# Patient Record
Sex: FEMALE | Race: WHITE | NOT HISPANIC OR LATINO | Employment: FULL TIME | URBAN - METROPOLITAN AREA
[De-identification: names, ages, dates, MRNs, and addresses within clinical notes are randomized per-mention and may not be internally consistent; named-entity substitution may affect disease eponyms.]

---
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
## 2020-01-10 ENCOUNTER — OFFICE VISIT - HEALTHEAST (OUTPATIENT)
Dept: FAMILY MEDICINE | Facility: CLINIC | Age: 20
End: 2020-01-10

## 2020-01-10 DIAGNOSIS — Z30.46 NEXPLANON REMOVAL: ICD-10-CM

## 2020-02-03 ENCOUNTER — OFFICE VISIT - HEALTHEAST (OUTPATIENT)
Dept: FAMILY MEDICINE | Facility: CLINIC | Age: 20
End: 2020-02-03

## 2020-02-03 DIAGNOSIS — R19.7 DIARRHEA, UNSPECIFIED TYPE: ICD-10-CM

## 2020-02-03 DIAGNOSIS — R11.10 VOMITING, INTRACTABILITY OF VOMITING NOT SPECIFIED, PRESENCE OF NAUSEA NOT SPECIFIED, UNSPECIFIED VOMITING TYPE: ICD-10-CM

## 2020-02-03 LAB
ALBUMIN SERPL-MCNC: 4.4 G/DL (ref 3.5–5)
ALBUMIN UR-MCNC: NEGATIVE MG/DL
ALP SERPL-CCNC: 48 U/L (ref 45–120)
ALT SERPL W P-5'-P-CCNC: 18 U/L (ref 0–45)
ANION GAP SERPL CALCULATED.3IONS-SCNC: 9 MMOL/L (ref 5–18)
APPEARANCE UR: CLEAR
AST SERPL W P-5'-P-CCNC: 13 U/L (ref 0–40)
BASOPHILS # BLD AUTO: 0.1 THOU/UL (ref 0–0.2)
BASOPHILS NFR BLD AUTO: 1 % (ref 0–2)
BILIRUB SERPL-MCNC: 0.3 MG/DL (ref 0–1)
BILIRUB UR QL STRIP: NEGATIVE
BUN SERPL-MCNC: 11 MG/DL (ref 8–22)
CALCIUM SERPL-MCNC: 10 MG/DL (ref 8.5–10.5)
CHLORIDE BLD-SCNC: 106 MMOL/L (ref 98–107)
CO2 SERPL-SCNC: 26 MMOL/L (ref 22–31)
COLOR UR AUTO: YELLOW
CREAT SERPL-MCNC: 0.72 MG/DL (ref 0.6–1.1)
EOSINOPHIL # BLD AUTO: 0.1 THOU/UL (ref 0–0.4)
EOSINOPHIL NFR BLD AUTO: 2 % (ref 0–6)
ERYTHROCYTE [DISTWIDTH] IN BLOOD BY AUTOMATED COUNT: 11.7 % (ref 11–14.5)
GFR SERPL CREATININE-BSD FRML MDRD: >60 ML/MIN/1.73M2
GLUCOSE BLD-MCNC: 82 MG/DL (ref 70–125)
GLUCOSE UR STRIP-MCNC: NEGATIVE MG/DL
HCG UR QL: NEGATIVE
HCT VFR BLD AUTO: 42.5 % (ref 35–47)
HGB BLD-MCNC: 14.5 G/DL (ref 12–16)
HGB UR QL STRIP: NEGATIVE
KETONES UR STRIP-MCNC: NEGATIVE MG/DL
LEUKOCYTE ESTERASE UR QL STRIP: NEGATIVE
LYMPHOCYTES # BLD AUTO: 2.6 THOU/UL (ref 0.8–4.4)
LYMPHOCYTES NFR BLD AUTO: 33 % (ref 20–40)
MCH RBC QN AUTO: 33.5 PG (ref 27–34)
MCHC RBC AUTO-ENTMCNC: 34.1 G/DL (ref 32–36)
MCV RBC AUTO: 98 FL (ref 80–100)
MONOCYTES # BLD AUTO: 0.4 THOU/UL (ref 0–0.9)
MONOCYTES NFR BLD AUTO: 5 % (ref 2–10)
NEUTROPHILS # BLD AUTO: 4.7 THOU/UL (ref 2–7.7)
NEUTROPHILS NFR BLD AUTO: 59 % (ref 50–70)
NITRATE UR QL: NEGATIVE
PH UR STRIP: 6 [PH] (ref 5–8)
PLATELET # BLD AUTO: 289 THOU/UL (ref 140–440)
PMV BLD AUTO: 7.3 FL (ref 7–10)
POTASSIUM BLD-SCNC: 4.1 MMOL/L (ref 3.5–5)
PROT SERPL-MCNC: 7.2 G/DL (ref 6–8)
RBC # BLD AUTO: 4.32 MILL/UL (ref 3.8–5.4)
SODIUM SERPL-SCNC: 141 MMOL/L (ref 136–145)
SP GR UR STRIP: 1.02 (ref 1–1.03)
TSH SERPL DL<=0.005 MIU/L-ACNC: 1.2 UIU/ML (ref 0.3–5)
UROBILINOGEN UR STRIP-ACNC: NORMAL
WBC: 8 THOU/UL (ref 4–11)

## 2020-02-06 ENCOUNTER — COMMUNICATION - HEALTHEAST (OUTPATIENT)
Dept: FAMILY MEDICINE | Facility: CLINIC | Age: 20
End: 2020-02-06

## 2020-02-06 LAB
GLIADIN IGA SER-ACNC: 1.1 U/ML
GLIADIN IGG SER-ACNC: 0.7 U/ML
IGA SERPL-MCNC: 141 MG/DL (ref 65–400)
TTG IGA SER-ACNC: 0.2 U/ML
TTG IGG SER-ACNC: <0.6 U/ML

## 2020-02-10 ENCOUNTER — COMMUNICATION - HEALTHEAST (OUTPATIENT)
Dept: FAMILY MEDICINE | Facility: CLINIC | Age: 20
End: 2020-02-10

## 2020-05-04 ENCOUNTER — OFFICE VISIT - HEALTHEAST (OUTPATIENT)
Dept: FAMILY MEDICINE | Facility: CLINIC | Age: 20
End: 2020-05-04

## 2020-05-04 ENCOUNTER — VIRTUAL VISIT (OUTPATIENT)
Dept: FAMILY MEDICINE | Facility: OTHER | Age: 20
End: 2020-05-04

## 2020-05-04 ENCOUNTER — COMMUNICATION - HEALTHEAST (OUTPATIENT)
Dept: SCHEDULING | Facility: CLINIC | Age: 20
End: 2020-05-04

## 2020-05-04 DIAGNOSIS — Z20.822 SUSPECTED 2019 NOVEL CORONAVIRUS INFECTION: ICD-10-CM

## 2020-05-04 NOTE — PROGRESS NOTES
"Date: 2020 08:30:33  Clinician: Alireza Harrington  Clinician NPI: [id number]  Patient: Paulette Harvey  Patient : 2000  Patient Address: [address] [place] [address]  Patient Phone: [phone]  Visit Protocol: URI  Patient Summary:  Paulette is a 19 year old ( : 2000 ) female who initiated a Visit for COVID-19 (Coronavirus) evaluation and screening. When asked the question \"Please sign me up to receive news, health information and promotions from [employer].\", Paulette responded \"No\".    Her symptoms consist of rhinitis, facial pain or pressure, nasal congestion, nausea, ageusia, anosmia, a headache, and malaise. She is experiencing difficulty breathing due to nasal congestion but she is not short of breath.   Symptom details     Nasal secretions: The color of her mucus is clear.    Facial pain or pressure: The facial pain or pressure does not feel worse when bending or leaning forward.     Headache: She states the headache is moderate (4-6 on a 10 point pain scale).      Paulette denies having fever, myalgias, sore throat, cough, vomiting, teeth pain, diarrhea, ear pain, wheezing, and chills. She also denies taking antibiotic medication for the symptoms and having recent facial or sinus surgery in the past 60 days.    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 She provides direct patient care. 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   She lives with a healthcare worker.   Paulette has not had a close contact with a laboratory-confirmed COVID-19 patient within 14 days of symptom onset. She also has not had a close contact with a suspected COVID-19 patient within 14 days of symptom onset.   Pertinent medical history  Paulette does not get yeast infections when she takes antibiotics.   " Paulette needs a return to work/school note.   Weight: 165 lbs   Paulette smokes or uses smokeless tobacco.   She denies pregnancy and denies breastfeeding. She has menstruated in the past month.   Weight: 165 lbs    MEDICATIONS: hydroxyzine HCl oral, ALLERGIES: NKDA  Clinician Response:  Dear Paulette,      Your symptoms show that you may have coronavirus (COVID-19). This illness can cause fever, cough and trouble breathing. Many people get a mild case and get better on their own. Some people can get very sick.   Will I be tested for COVID-19?  We would recommend you be tested for coronavirus. Here is how to get that scheduled:   Call [phone]. Tell them you were referred by [facility] to have a COVID-19 test. You will be scheduled at one of our [facility] testing locations (drive-up). Please have your [facility] visit information ready when you call including your visit ID number to verify you were referred.    How can I protect others in the meantime?  First, stay home and away from others (self-isolate) until:   You've had no fever---and no medicine that reduces fever---for 3 full days (72 hours). And...    Your other symptoms have gotten better. For example, your cough or breathing has improved. And...    At least 7 days have passed since your symptoms started.   During this time:   Don't go to work, school or anywhere else.     Stay away from others in your home. No hugging, kissing or shaking hands.    Don't let anyone visit.    Cover your mouth and nose with a mask, tissue or wash cloth to avoid spreading germs.    Wash your hands and face often. Use soap and water.   How can I take care of myself?  1.Take Tylenol (acetaminophen) for fever or pain. If you have liver or kidney problems, ask your family doctor if it's okay to take Tylenol.   Adults can take either:    650 mg (two 325 mg pills) every 4 to 6 hours, or...    1,000 mg (two 500 mg pills) every 8 hours as needed.     Note: Don't take more than 3,000 mg in one  day.   For children, check the Tylenol bottle for the right dose. The dose is based on the child's age or weight.  2.If you have other health problems (like cancer, heart failure, an organ transplant or severe kidney disease): Call your specialty clinic if you don't feel better in the next 2 days.  3.Know when to call 911: If your breathing is so bad that it keeps you from doing normal activities, call 911 or go to the emergency room. Tell them that you've been staying home and may have COVID-19.  4.Sign up for [employer]. We know it's scary to hear that you might have COVID-19. We want to track your symptoms to make sure you're okay over the next 2 weeks. Please look for an email from [employer]---this is a free, online program that we'll use to keep in touch. To sign up, follow the link in the email. Learn more at [email or web address].  Where can I get more information?  To learn more about COVID-19 and how to care for yourself at home, please visit the CDC website at https://www.cdc.gov/coronavirus/2019-ncov/about/steps-when-sick.html.  For more about your care at [facility], please visit [email or web address]     Diagnosis: Acute upper respiratory infection, unspecified  Diagnosis ICD: J06.9

## 2020-05-05 ENCOUNTER — COMMUNICATION - HEALTHEAST (OUTPATIENT)
Dept: EMERGENCY MEDICINE | Facility: HOSPITAL | Age: 20
End: 2020-05-05

## 2020-05-06 ENCOUNTER — NURSE TRIAGE (OUTPATIENT)
Dept: NURSING | Facility: CLINIC | Age: 20
End: 2020-05-06

## 2020-05-06 ENCOUNTER — COMMUNICATION - HEALTHEAST (OUTPATIENT)
Dept: SCHEDULING | Facility: CLINIC | Age: 20
End: 2020-05-06

## 2020-05-06 ENCOUNTER — COMMUNICATION - HEALTHEAST (OUTPATIENT)
Dept: FAMILY MEDICINE | Facility: CLINIC | Age: 20
End: 2020-05-06

## 2020-05-06 NOTE — TELEPHONE ENCOUNTER
Component  5/4/20 0923   COVID-19 VIRUS SPECIMEN SOURCE  Nasopharyngeal     2019-nCOV  Detected, Abnormal ResultPanic       Comment: Positive for 2019-nCoV.      See her [facility] chart for her lab test results she is seeking.    Amy Cueva RN  [facility] Nurse Advisor

## 2020-05-07 ENCOUNTER — OFFICE VISIT - HEALTHEAST (OUTPATIENT)
Dept: FAMILY MEDICINE | Facility: CLINIC | Age: 20
End: 2020-05-07

## 2020-05-07 ENCOUNTER — COMMUNICATION - HEALTHEAST (OUTPATIENT)
Dept: FAMILY MEDICINE | Facility: CLINIC | Age: 20
End: 2020-05-07

## 2020-05-07 DIAGNOSIS — F33.1 MODERATE RECURRENT MAJOR DEPRESSION (H): ICD-10-CM

## 2020-05-07 DIAGNOSIS — F41.1 GENERALIZED ANXIETY DISORDER: ICD-10-CM

## 2020-05-07 DIAGNOSIS — U07.1 COVID-19 VIRUS INFECTION: ICD-10-CM

## 2020-05-07 ASSESSMENT — ANXIETY QUESTIONNAIRES
4. TROUBLE RELAXING: NEARLY EVERY DAY
IF YOU CHECKED OFF ANY PROBLEMS ON THIS QUESTIONNAIRE, HOW DIFFICULT HAVE THESE PROBLEMS MADE IT FOR YOU TO DO YOUR WORK, TAKE CARE OF THINGS AT HOME, OR GET ALONG WITH OTHER PEOPLE: SOMEWHAT DIFFICULT
2. NOT BEING ABLE TO STOP OR CONTROL WORRYING: NEARLY EVERY DAY
1. FEELING NERVOUS, ANXIOUS, OR ON EDGE: NEARLY EVERY DAY
5. BEING SO RESTLESS THAT IT IS HARD TO SIT STILL: MORE THAN HALF THE DAYS
GAD7 TOTAL SCORE: 18
3. WORRYING TOO MUCH ABOUT DIFFERENT THINGS: NEARLY EVERY DAY
7. FEELING AFRAID AS IF SOMETHING AWFUL MIGHT HAPPEN: MORE THAN HALF THE DAYS
6. BECOMING EASILY ANNOYED OR IRRITABLE: MORE THAN HALF THE DAYS

## 2020-05-07 ASSESSMENT — PATIENT HEALTH QUESTIONNAIRE - PHQ9: SUM OF ALL RESPONSES TO PHQ QUESTIONS 1-9: 19

## 2020-06-02 ENCOUNTER — COMMUNICATION - HEALTHEAST (OUTPATIENT)
Dept: FAMILY MEDICINE | Facility: CLINIC | Age: 20
End: 2020-06-02

## 2020-06-02 DIAGNOSIS — F41.1 GENERALIZED ANXIETY DISORDER: ICD-10-CM

## 2020-06-04 ENCOUNTER — COMMUNICATION - HEALTHEAST (OUTPATIENT)
Dept: FAMILY MEDICINE | Facility: CLINIC | Age: 20
End: 2020-06-04

## 2020-08-01 ENCOUNTER — VIRTUAL VISIT (OUTPATIENT)
Dept: FAMILY MEDICINE | Facility: OTHER | Age: 20
End: 2020-08-01
Payer: COMMERCIAL

## 2020-08-01 ENCOUNTER — AMBULATORY - HEALTHEAST (OUTPATIENT)
Dept: FAMILY MEDICINE | Facility: CLINIC | Age: 20
End: 2020-08-01

## 2020-08-01 DIAGNOSIS — Z20.822 SUSPECTED COVID-19 VIRUS INFECTION: ICD-10-CM

## 2020-08-01 NOTE — PROGRESS NOTES
"Date: 2020 11:14:04  Clinician: Jorge Alberto Slade  Clinician NPI: [id number]  Patient: Paulette Harvey  Patient : 2000  Patient Address: [address] [place] [address]  Patient Phone: [phone]  Visit Protocol: URI  Patient Summary:  Paulette is a 19 year old ( : 2000 ) female who initiated a Visit for COVID-19 (Coronavirus) evaluation and screening. When asked the question \"Please sign me up to receive news, health information and promotions from [employer].\", Paulette responded \"No\".    Paulette states her symptoms started gradually 3-4 days ago. After her symptoms started, they improved and then got worse again.   Her symptoms consist of diarrhea, myalgia, a sore throat, facial pain or pressure, a cough, nasal congestion, rhinitis, malaise, and a headache. She is experiencing difficulty breathing due to nasal congestion but she is not short of breath.   Symptom details     Nasal secretions: The color of her mucus is white.    Cough: Paulette coughs a few times an hour and her cough is more bothersome at night. Phlegm comes into her throat when she coughs. She does not believe her cough is caused by post-nasal drip. The color of the phlegm is yellow.     Sore throat: Paulette reports having moderate throat pain (4-6 on a 10 point pain scale), does not have exudate on her tonsils, and can swallow liquids. She is not sure if the lymph nodes in her neck are enlarged. A rash has not appeared on the skin since the sore throat started.     Facial pain or pressure: The facial pain or pressure feels worse when bending over or leaning forward.     Headache: She states the headache is moderate (4-6 on a 10 point pain scale).      Paulette denies having wheezing, nausea, teeth pain, ageusia, anosmia, fever, vomiting, ear pain, and chills. She also denies having recent facial or sinus surgery in the past 60 days, taking antibiotic medication in the past month, and having a sinus infection within the past year.   " Precipitating events  Within the past week, Paulette has not been exposed to someone with strep throat. She has not recently been exposed to someone with influenza. Paulette has not been in close contact with any high risk individuals.   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   She does not work or volunteer as healthcare worker or a  and does not work or volunteer in a healthcare facility.   Paulette has not lived in a congregate living setting in the past 14 days. She lives with a healthcare worker.   Paulette has not had a close contact with a laboratory-confirmed COVID-19 patient within 14 days of symptom onset.   Pertinent medical history  Paulette does not get yeast infections when she takes antibiotics.   Paulette needs a return to work/school note.   Weight: 180 lbs   Paulette smokes or uses smokeless tobacco.   She denies pregnancy and denies breastfeeding. She is currently menstruating.   Weight: 180 lbs    MEDICATIONS: escitalopram oxalate oral, hydroxyzine HCl oral, ALLERGIES: NKDA  Clinician Response:  Dear Paulette,   Your symptoms show that you may have coronavirus (COVID-19). This illness can cause fever, cough and trouble breathing. Many people get a mild case and get better on their own. Some people can get very sick.  What should I do?  We would like to test you for this virus.   1. Please call [phone] to schedule your visit. Explain that you were referred by [facility] to have a COVID-19 test. Be ready to share [facility] number.  The following will serve as your written order for this COVID Test, ordered by me, for the indication of suspected COVID [Z20.828]: The test will be ordered in [email or web address], our electronic health record, after you are scheduled. It will show as ordered and authorized by Sacha Cheng MD.  Order: COVID-19 (Coronavirus) PCR for SYMPTOMATIC testing from [facility].      2. When it's time for your  "COVID test:  Stay at least 6 feet away from others. (If someone will drive you to your test, stay in the backseat, as far away from the  as you can.)   Cover your mouth and nose with a mask, tissue or washcloth.  Go straight to the testing site. Don't make any stops on the way there or back.      3.Starting now: Stay home and away from others (self-isolate) until:   You've had no fever---and no medicine that reduces fever---for 3 full days (72 hours). And...   Your other symptoms have gotten better. For example, your cough or breathing has improved. And...   At least 10 days have passed since your symptoms started.       During this time, don't leave the house except for testing or medical care.   Stay in your own room, even for meals. Use your own bathroom if you can.   Stay away from others in your home. No hugging, kissing or shaking hands. No visitors.  Don't go to work, school or anywhere else.    Clean \"high touch\" surfaces often (doorknobs, counters, handles, etc.). Use a household cleaning spray or wipes. You'll find a full list of  on the EPA website: www.epa.gov/pesticide-registration/list-n-disinfectants-use-against-sars-cov-2.   Cover your mouth and nose with a mask, tissue or washcloth to avoid spreading germs.  Wash your hands and face often. Use soap and water.  Caregivers in these groups are at risk for severe illness due to COVID-19:  o People 65 years and older  o People who live in a nursing home or long-term care facility  o People with chronic disease (lung, heart, cancer, diabetes, kidney, liver, immunologic)  o People who have a weakened immune system, including those who:   Are in cancer treatment  Take medicine that weakens the immune system, such as corticosteroids  Had a bone marrow or organ transplant  Have an immune deficiency  Have poorly controlled HIV or AIDS  Are obese (body mass index of 40 or higher)  Smoke regularly   o Caregivers should wear gloves while washing " dishes, handling laundry and cleaning bedrooms and bathrooms.  o Use caution when washing and drying laundry: Don't shake dirty laundry, and use the warmest water setting that you can.  o For more tips, go to www.cdc.gov/coronavirus/2019-ncov/downloads/10Things.pdf.    4.Sign up for [employer]. We know it's scary to hear that you might have COVID-19. We want to track your symptoms to make sure you're okay over the next 2 weeks. Please look for an email from [employer]---this is a free, online program that we'll use to keep in touch. To sign up, follow the link in the email. Learn more at [email or web address]  How can I take care of myself?   Get lots of rest. Drink extra fluids (unless a doctor has told you not to).   Take Tylenol (acetaminophen) for fever or pain. If you have liver or kidney problems, ask your family doctor if it's okay to take Tylenol.   Adults can take either:    650 mg (two 325 mg pills) every 4 to 6 hours, or...   1,000 mg (two 500 mg pills) every 8 hours as needed.    Note: Don't take more than 3,000 mg in one day. Acetaminophen is found in many medicines (both prescribed and over-the-counter medicines). Read all labels to be sure you don't take too much.   For children, check the Tylenol bottle for the right dose. The dose is based on the child's age or weight.    If you have other health problems (like cancer, heart failure, an organ transplant or severe kidney disease): Call your specialty clinic if you don't feel better in the next 2 days.       Know when to call 911. Emergency warning signs include:    Trouble breathing or shortness of breath Pain or pressure in the chest that doesn't go away Feeling confused like you haven't felt before, or not being able to wake up Bluish-colored lips or face.  Where can I get more information?    [employer] -- About COVID-19: [email or web address]   CDC -- What to Do If You're Sick:  www.cdc.gov/coronavirus/2019-ncov/about/steps-when-sick.html   CDC -- Ending Home Isolation: www.cdc.gov/coronavirus/2019-ncov/hcp/disposition-in-home-patients.html   [facility] -- Caring for Someone: www.cdc.gov/coronavirus/2019-ncov/if-you-are-sick/care-for-someone.html   [facility] -- Interim Guidance for Hospital Discharge to Home: www.[email or web address]./diseases/coronavirus/hcp/hospdischarge.pdf   [facility] clinical trials (COVID-19 research studies): clinicalaffairs.[facility]-clinical-trials    Below are the COVID-19 hotlines at the [place] Department of [facility] ([facility]). Interpreters are available.    For health questions: Call 129-471-0356 or 1-292.608.7332 (7 a.m. to 7 p.m.) For questions about schools and childcare: Call 147-751-8349 or 1-131.225.6440 (7 a.m. to 7 p.m.)    Diagnosis: Cough  Diagnosis ICD: R05

## 2020-08-02 ENCOUNTER — AMBULATORY - HEALTHEAST (OUTPATIENT)
Dept: FAMILY MEDICINE | Facility: CLINIC | Age: 20
End: 2020-08-02

## 2020-08-02 DIAGNOSIS — Z20.822 SUSPECTED COVID-19 VIRUS INFECTION: ICD-10-CM

## 2020-08-04 ENCOUNTER — COMMUNICATION - HEALTHEAST (OUTPATIENT)
Dept: SCHEDULING | Facility: CLINIC | Age: 20
End: 2020-08-04

## 2020-08-29 ENCOUNTER — OFFICE VISIT - HEALTHEAST (OUTPATIENT)
Dept: FAMILY MEDICINE | Facility: CLINIC | Age: 20
End: 2020-08-29

## 2020-08-29 DIAGNOSIS — R19.7 DIARRHEA, UNSPECIFIED TYPE: ICD-10-CM

## 2020-08-31 ENCOUNTER — COMMUNICATION - HEALTHEAST (OUTPATIENT)
Dept: FAMILY MEDICINE | Facility: CLINIC | Age: 20
End: 2020-08-31

## 2020-09-07 ENCOUNTER — OFFICE VISIT - HEALTHEAST (OUTPATIENT)
Dept: FAMILY MEDICINE | Facility: CLINIC | Age: 20
End: 2020-09-07

## 2020-09-07 ENCOUNTER — COMMUNICATION - HEALTHEAST (OUTPATIENT)
Dept: FAMILY MEDICINE | Facility: CLINIC | Age: 20
End: 2020-09-07

## 2020-09-07 DIAGNOSIS — R11.10 VOMITING, INTRACTABILITY OF VOMITING NOT SPECIFIED, PRESENCE OF NAUSEA NOT SPECIFIED, UNSPECIFIED VOMITING TYPE: ICD-10-CM

## 2020-09-14 ENCOUNTER — OFFICE VISIT - HEALTHEAST (OUTPATIENT)
Dept: FAMILY MEDICINE | Facility: CLINIC | Age: 20
End: 2020-09-14

## 2020-09-14 DIAGNOSIS — R19.7 VOMITING AND DIARRHEA: ICD-10-CM

## 2020-09-14 DIAGNOSIS — R11.10 VOMITING AND DIARRHEA: ICD-10-CM

## 2020-09-14 DIAGNOSIS — F32.1 MODERATE MAJOR DEPRESSION (H): ICD-10-CM

## 2020-09-14 DIAGNOSIS — F41.9 ANXIETY: ICD-10-CM

## 2020-09-14 ASSESSMENT — PATIENT HEALTH QUESTIONNAIRE - PHQ9: SUM OF ALL RESPONSES TO PHQ QUESTIONS 1-9: 11

## 2020-09-16 ENCOUNTER — AMBULATORY - HEALTHEAST (OUTPATIENT)
Dept: LAB | Facility: CLINIC | Age: 20
End: 2020-09-16

## 2020-09-16 DIAGNOSIS — R19.7 DIARRHEA: ICD-10-CM

## 2020-09-17 ENCOUNTER — AMBULATORY - HEALTHEAST (OUTPATIENT)
Dept: LAB | Facility: CLINIC | Age: 20
End: 2020-09-17

## 2020-09-17 DIAGNOSIS — R11.10 VOMITING AND DIARRHEA: ICD-10-CM

## 2020-09-17 DIAGNOSIS — R19.7 DIARRHEA, UNSPECIFIED TYPE: ICD-10-CM

## 2020-09-17 DIAGNOSIS — R19.7 VOMITING AND DIARRHEA: ICD-10-CM

## 2020-09-17 LAB
C DIFF TOX B STL QL: NEGATIVE
RIBOTYPE 027/NAP1/BI: NORMAL

## 2020-09-18 LAB
G LAMBLIA AG STL QL IA: NORMAL
O+P STL MICRO: NORMAL
SHIGA TOXIN 1: NEGATIVE
SHIGA TOXIN 2: NEGATIVE

## 2020-09-20 LAB — BACTERIA SPEC CULT: NORMAL

## 2020-09-23 ENCOUNTER — AMBULATORY - HEALTHEAST (OUTPATIENT)
Dept: LAB | Facility: CLINIC | Age: 20
End: 2020-09-23

## 2020-09-23 DIAGNOSIS — R19.7 VOMITING AND DIARRHEA: ICD-10-CM

## 2020-09-23 DIAGNOSIS — R11.10 VOMITING AND DIARRHEA: ICD-10-CM

## 2020-09-23 DIAGNOSIS — R19.7 DIARRHEA: ICD-10-CM

## 2020-09-24 ENCOUNTER — COMMUNICATION - HEALTHEAST (OUTPATIENT)
Dept: FAMILY MEDICINE | Facility: CLINIC | Age: 20
End: 2020-09-24

## 2020-09-24 LAB
O+P STL MICRO: NORMAL
O+P STL MICRO: NORMAL

## 2020-09-29 DIAGNOSIS — Z11.59 SCREENING FOR VIRAL DISEASE: ICD-10-CM

## 2020-10-05 LAB — H PYLORI AG STL QL IA: NEGATIVE

## 2020-10-06 ENCOUNTER — COMMUNICATION - HEALTHEAST (OUTPATIENT)
Dept: FAMILY MEDICINE | Facility: CLINIC | Age: 20
End: 2020-10-06

## 2020-10-06 DIAGNOSIS — R11.10 VOMITING AND DIARRHEA: ICD-10-CM

## 2020-10-06 DIAGNOSIS — R19.7 VOMITING AND DIARRHEA: ICD-10-CM

## 2020-11-19 ENCOUNTER — VIRTUAL VISIT (OUTPATIENT)
Dept: FAMILY MEDICINE | Facility: OTHER | Age: 20
End: 2020-11-19
Payer: COMMERCIAL

## 2020-11-19 DIAGNOSIS — Z20.822 SUSPECTED COVID-19 VIRUS INFECTION: Primary | ICD-10-CM

## 2020-11-19 PROCEDURE — 99421 OL DIG E/M SVC 5-10 MIN: CPT | Performed by: PHYSICIAN ASSISTANT

## 2020-11-19 NOTE — PROGRESS NOTES
"Date: 2020 10:24:27  Clinician: Alireza Harrington  Clinician NPI: [id number]  Patient: Paulette Harvey  Patient : 2000  Patient Address: [address] [place] [id number]  Patient Phone: [phone]  Visit Protocol: URI  Patient Summary:  Paulette is a 20 year old ( : 2000 ) female who initiated a [facility] Visit for COVID-19 (Coronavirus) evaluation and screening. When asked the question \"Please sign me up to receive news, health information and promotions from [facility].\", Paulette responded \"No\".    Paulette states her symptoms started 1-2 days ago.   Her symptoms consist of vomiting, rhinitis, facial pain or pressure, malaise, diarrhea, a headache, wheezing, a cough, nasal congestion, and nausea. She is experiencing mild difficulty breathing with activities but can speak normally in full sentences. Paulette also feels feverish.   Symptom details     Nasal secretions: The color of her mucus is yellow, white, and clear.    Cough: Paulette coughs a few times an hour and her cough is more bothersome at night. Phlegm comes into her throat when she coughs. She does not believe her cough is caused by post-nasal drip. The color of the phlegm is white and clear.     Temperature: Her current temperature is 97.8 degrees Fahrenheit.     Wheezing: Paulette has been diagnosed with asthma. Additional wheezing details as reported by the patient (free text): I wheeze occasionally and it doesn't really effect me       Facial pain or pressure: The facial pain or pressure does not feel worse when bending or leaning forward.     Headache: She states the headache is moderate (4-6 on a 10 point pain scale).      Paulette denies having myalgias, chills, sore throat, teeth pain, ageusia, ear pain, and anosmia. She also denies taking antibiotic medication in the past month, having recent facial or sinus surgery in the past 60 days, and having a sinus infection within the past year.   Precipitating events  She has recently been exposed " to someone with influenza. Paulette has not been in close contact with any high risk individuals.   Pertinent COVID-19 (Coronavirus) information  Paulette does not work or volunteer as healthcare worker or a . 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   In the past 14 days, she has not lived in a congregate living setting.   Paulette has had a close contact with a laboratory-confirmed COVID-19 patient within 14 days of symptom onset. She was exposed at her work. 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      Result of COVID-19 test: Positive      Date of her COVID-19 test: 06/01/2020     Date(s) of previous URI or influenza-like illness (free-text): I'm not sure. I get sick a lot     Symptoms Paulette experienced during previous URI or influenza-like illness as reported by the patient (free-text): Fatigue, vomiting, headache        Pertinent medical history  Paulette does not get yeast infections when she takes antibiotics.   Paulette does not need a return to work/school note.   Weight: 180 lbs   Paulette smokes or uses smokeless tobacco.   She denies pregnancy and denies breastfeeding. She is currently menstruating.   Weight: 180 lbs    MEDICATIONS: escitalopram oxalate oral, hydroxyzine HCl oral, ALLERGIES: NKDA  Clinician Response:  Dear Paulette,   Your symptoms show that you may have coronavirus (COVID-19). This illness can cause fever, cough and trouble breathing. Many  "people get a mild case and get better on their own. Some people can get very sick.  What should I do?  We would like to test you for this virus.   1. Please call [phone] to schedule your visit. Explain that you were referred by [facility] to have a COVID-19 test. Be ready to share your [facility] visit ID number.  * If you need to schedule in [facility] please call [phone] or for [employer] employees please call [phone].  * If you need to schedule in the [place] area please call [phone]. [employer] employees call [phone].  The following will serve as your written order for this COVID Test, ordered by me, for the indication of suspected COVID [Z20.828]: The test will be ordered in [employer], our electronic health record, after you are scheduled. It will show as ordered and authorized by Sacha Cheng MD.  Order: COVID-19 (Coronavirus) PCR for SYMPTOMATIC testing from [facility].   2. When it's time for your COVID test:  Stay at least 6 feet away from others. (If someone will drive you to your test, stay in the backseat, as far away from the  as you can.)   Cover your mouth and nose with a mask, tissue or washcloth.  Go straight to the testing site. Don't make any stops on the way there or back.      3.Starting now: Stay home and away from others (self-isolate) until:   You've had no fever---and no medicine that reduces fever---for one full day (24 hours). And...   Your other symptoms have gotten better. For example, your cough or breathing has improved. And...   At least 10 days have passed since your symptoms started.       During this time, don't leave the house except for testing or medical care.   Stay in your own room, even for meals. Use your own bathroom if you can.   Stay away from others in your home. No hugging, kissing or shaking hands. No visitors.  Don't go to work, school or anywhere else.    Clean \"high touch\" surfaces often (doorknobs, counters, handles, etc.). Use a household cleaning spray " or wipes. You'll find a full list of  on the EPA website: www.epa.gov/pesticide-registration/list-n-disinfectants-use-against-sars-cov-2.   Cover your mouth and nose with a mask, tissue or washcloth to avoid spreading germs.  Wash your hands and face often. Use soap and water.  Caregivers in these groups are at risk for severe illness due to COVID-19:  o People 65 years and older  o People who live in a nursing home or long-term care facility  o People with chronic disease (lung, heart, cancer, diabetes, kidney, liver, immunologic)  o People who have a weakened immune system, including those who:   Are in cancer treatment  Take medicine that weakens the immune system, such as corticosteroids  Had a bone marrow or organ transplant  Have an immune deficiency  Have poorly controlled HIV or AIDS  Are obese (body mass index of 40 or higher)  Smoke regularly   o Caregivers should wear gloves while washing dishes, handling laundry and cleaning bedrooms and bathrooms.  o Use caution when washing and drying laundry: Don't shake dirty laundry, and use the warmest water setting that you can.  o For more tips, go to www.cdc.gov/coronavirus/2019-ncov/downloads/10Things.pdf.    How can I take care of myself?    Get lots of rest. Drink extra fluids (unless a doctor has told you not to).   Take Tylenol (acetaminophen) for fever or pain. If you have liver or kidney problems, ask your family doctor if it's okay to take Tylenol.   Adults can take either:    650 mg (two 325 mg pills) every 4 to 6 hours, or...   1,000 mg (two 500 mg pills) every 8 hours as needed.    Note: Don't take more than 3,000 mg in one day. Acetaminophen is found in many medicines (both prescribed and over-the-counter medicines). Read all labels to be sure you don't take too much.   For children, check the Tylenol bottle for the right dose. The dose is based on the child's age or weight.    If you have other health problems (like cancer, heart failure, an  organ transplant or severe kidney disease): Call your specialty clinic if you don't feel better in the next 2 days.       Know when to call 911. Emergency warning signs include:    Trouble breathing or shortness of breath Pain or pressure in the chest that doesn't go away Feeling confused like you haven't felt before, or not being able to wake up Bluish-colored lips or face.  Where can I get more information?   [facility] -- About COVID-19: [email or web address]   CDC -- What to Do If You're Sick: www.cdc.gov/coronavirus/2019-ncov/about/steps-when-sick.html   CDC -- Ending Home Isolation: www.cdc.gov/coronavirus/2019-ncov/hcp/disposition-in-home-patients.html   CDC -- Caring for Someone: www.cdc.gov/coronavirus/2019-ncov/if-you-are-sick/care-for-someone.html   [facility] -- Interim Guidance for Hospital Discharge to Home: www.[facility].mn./diseases/coronavirus/hcp/hospdischarge.pdf   [facility] clinical trials (COVID-19 research studies): [email or web address]-clinical-trials    Below are the COVID-19 hotlines at the [facility] ([facility]). Interpreters are available.    For health questions: Call 715-935-8088 or 1-764.362.8826 (7 a.m. to 7 p.m.) For questions about schools and childcare: Call 331-177-5410 or 1-711.255.3923 (7 a.m. to 7 p.m.)    Diagnosis: Contact with and (suspected) exposure to other viral communicable diseases  Diagnosis ICD: Z20.828

## 2020-11-20 DIAGNOSIS — Z20.822 SUSPECTED COVID-19 VIRUS INFECTION: ICD-10-CM

## 2020-11-20 PROCEDURE — U0003 INFECTIOUS AGENT DETECTION BY NUCLEIC ACID (DNA OR RNA); SEVERE ACUTE RESPIRATORY SYNDROME CORONAVIRUS 2 (SARS-COV-2) (CORONAVIRUS DISEASE [COVID-19]), AMPLIFIED PROBE TECHNIQUE, MAKING USE OF HIGH THROUGHPUT TECHNOLOGIES AS DESCRIBED BY CMS-2020-01-R: HCPCS | Performed by: PHYSICIAN ASSISTANT

## 2020-11-21 LAB
SARS-COV-2 RNA SPEC QL NAA+PROBE: NOT DETECTED
SPECIMEN SOURCE: NORMAL

## 2021-02-10 ENCOUNTER — RECORDS - HEALTHEAST (OUTPATIENT)
Dept: ADMINISTRATIVE | Facility: OTHER | Age: 21
End: 2021-02-10

## 2021-03-31 ENCOUNTER — RECORDS - HEALTHEAST (OUTPATIENT)
Dept: ADMINISTRATIVE | Facility: OTHER | Age: 21
End: 2021-03-31

## 2021-05-27 ASSESSMENT — PATIENT HEALTH QUESTIONNAIRE - PHQ9
SUM OF ALL RESPONSES TO PHQ QUESTIONS 1-9: 19
SUM OF ALL RESPONSES TO PHQ QUESTIONS 1-9: 11

## 2021-05-28 ASSESSMENT — ANXIETY QUESTIONNAIRES: GAD7 TOTAL SCORE: 18

## 2021-05-28 NOTE — TELEPHONE ENCOUNTER
Medication Request  Medication name: Pro Air  Pharmacy Name and Location: [facility]  Reason for request: The below prescription is not covered by her insurance and the alternative per the patient is Pro Air.    albuterol (PROAIR HFA;PROVENTIL HFA;VENTOLIN HFA) 90 mcg/actuation inhaler 1 each 0 4/30/2019     Sig - Route: Inhale 2 puffs every 6 (six) hours as needed for wheezing. - Inhalation    Sent to pharmacy as: albuterol (PROAIR HFA;PROVENTIL HFA;VENTOLIN HFA) 90 mcg/actuation inhaler    Notes to Pharmacy: May substitute the equivalent medication per insurance preference.    E-Prescribing Status: Receipt confirmed by pharmacy (4/30/2019  9:23 AM CDT)        When did you use medication last?:  NA  Patient offered appointment:  No  Okay to leave a detailed message: no

## 2021-05-28 NOTE — PROGRESS NOTES
Assessment:     1. Viral URI with cough  albuterol (PROAIR HFA;PROVENTIL HFA;VENTOLIN HFA) 90 mcg/actuation inhaler           Plan:     Viral URI without symptoms of secondary bronchitis or sinusitis.  Occasional wheezing and exercise-induced bronchospasm and history of asthma as a child, leads me to add a albuterol inhaler.  Instructions are given for use of the inhaler.    Hot packs to the sinuses and nasal saline wash discussed.  Appears she will need her inhaler prior to exercise in the morning and at night.    PQ H9 value equals 3 and anxiety score equals 18.  She will continue to follow-up with her psychiatrist at [facility].    Subjective:      Colten is a 18 y.o. female presenting to my clinic for evaluation of persistent cough and question of wheezing.  Keri goes to [employer] school and has noted in the last 3 weeks a upper respiratory illness that now shows residual coughing with exercise and before she goes to bed.  Her sputum is productive but clear.  She has nasal drainage also but it is clear.  She does have some tenderness over her maxillary sinuses.  She has a history of childhood asthma.    It was her grandmother who noted some wheezing and thought she should be checked.    Keri has gone to [facility] and [facility] over the past 5 to 6 months.  Psychiatry has help manage her antidepressants.  She says she is to work in progress and her anxiety is still high.  Her anxieties ESTELA score is 18 today and her PQ H9 score is 3.    Patient appears quite calm and collected.  She tells me she is relieved that she is found some help for her issues..        Current Outpatient Medications on File Prior to Visit   Medication Sig Dispense Refill     escitalopram oxalate (LEXAPRO) 10 MG tablet Take 10 mg by mouth daily.       etonogestrel (NEXPLANON) 68 mg Impl implant 1 each by Subdermal route once.       hydrOXYzine HCl (ATARAX) 10 MG tablet Take 10 mg by mouth 3 (three) times a day  as needed for itching.       traZODone (DESYREL) 100 MG tablet Take 100 mg by mouth at bedtime.       No current facility-administered medications on file prior to visit.      No Known Allergies  History reviewed. No pertinent past medical history.  History reviewed. No pertinent surgical history.  Social History     Socioeconomic History     Marital status: Single     Spouse name: Not on file     Number of children: Not on file     Years of education: Not on file     Highest education level: Not on file   Occupational History     Not on file   Social Needs     Financial resource strain: Not on file     Food insecurity:     Worry: Not on file     Inability: Not on file     Transportation needs:     Medical: Not on file     Non-medical: Not on file   Tobacco Use     Smoking status: Never Smoker     Smokeless tobacco: Never Used   Substance and Sexual Activity     Alcohol use: No     Drug use: No     Sexual activity: Never   Lifestyle     Physical activity:     Days per week: Not on file     Minutes per session: Not on file     Stress: Not on file   Relationships     Social connections:     Talks on phone: Not on file     Gets together: Not on file     Attends Yazidi service: Not on file     Active member of club or organization: Not on file     Attends meetings of clubs or organizations: Not on file     Relationship status: Not on file     Intimate partner violence:     Fear of current or ex partner: Not on file     Emotionally abused: Not on file     Physically abused: Not on file     Forced sexual activity: Not on file   Other Topics Concern     Not on file   Social History Narrative     Not on file     History reviewed. No pertinent family history.    ROS:  I have performed a 10 point ROS.  All pertinent positives and negatives are found in the HPI.  All others are negative.  Has not used an inhaler since childhood    Objective:     Physical Exam:  /60   Pulse 80   Temp 98.6  F (37  C) (Oral)   Resp 20  "  Ht 5' 4\" (1.626 m)   Wt 147 lb (66.7 kg)   SpO2 98%   BMI 25.23 kg/m    General Appearance: Alert, cooperative, no distress, appears stated age    Head: Normocephalic, without obvious abnormality, atraumatic  Eyes: PERRL, conjunctiva/corneas clear, EOM's intact  Ears: Normal TM's and external ear canals, both ears  Nose: Nares normal, septum midline,mucosa normal, clear drainage/maxillary sinus tenderness  Throat: Lips, mucosa, and tongue normal; teeth and gums normal  Posterior pharyngeal drainage is clear to white in color.  Neck: Supple, symmetrical, trachea midline, no adenopathy;  thyroid: not enlarged, symmetric, no tenderness/mass/nodules; no carotid bruit or JVD  Lungs: Clear to auscultation bilaterally, respirations tight with decreased breath sounds.  No obvious wheezing heard  Heart: Regular rate and rhythm, S1 and S2 normal, no murmur, rub, or gallop,     Skin: Skin color, texture, turgor normal, no rashes or lesions  Lymph nodes: Cervical, supraclavicular, and axillary nodes normal  Neurologic: Normal   Mental status: Calm, good eye contact, adept at explaining herself and her mental health treatment    Labs:No components found for: 48H    Imaging:  All pertinent imaging reviewed       "

## 2021-05-30 VITALS — BODY MASS INDEX: 27.28 KG/M2 | WEIGHT: 159.9 LBS

## 2021-05-31 VITALS — BODY MASS INDEX: 29.67 KG/M2 | WEIGHT: 173.8 LBS | HEIGHT: 64 IN

## 2021-06-01 VITALS — WEIGHT: 165.8 LBS | HEIGHT: 64 IN | BODY MASS INDEX: 28.31 KG/M2

## 2021-06-02 VITALS — BODY MASS INDEX: 25.1 KG/M2 | HEIGHT: 64 IN | WEIGHT: 147 LBS

## 2021-06-02 VITALS — WEIGHT: 148.7 LBS | HEIGHT: 64 IN | BODY MASS INDEX: 25.39 KG/M2

## 2021-06-04 VITALS
HEART RATE: 93 BPM | WEIGHT: 165.2 LBS | TEMPERATURE: 98.1 F | OXYGEN SATURATION: 98 % | BODY MASS INDEX: 28.36 KG/M2 | SYSTOLIC BLOOD PRESSURE: 120 MMHG | DIASTOLIC BLOOD PRESSURE: 62 MMHG

## 2021-06-04 VITALS
DIASTOLIC BLOOD PRESSURE: 55 MMHG | BODY MASS INDEX: 27.14 KG/M2 | WEIGHT: 158.13 LBS | SYSTOLIC BLOOD PRESSURE: 105 MMHG | HEART RATE: 84 BPM

## 2021-06-05 NOTE — PROGRESS NOTES
Patient ID: Paulette Harvey is a 19 y.o. female.  /62 (Patient Site: Right Arm, Patient Position: Sitting, Cuff Size: Adult Regular)   Pulse 93   Temp 98.1  F (36.7  C)   Wt 165 lb 3.2 oz (74.9 kg)   SpO2 98%   BMI 28.36 kg/m      Assessment/Plan:                   Diagnoses and all orders for this visit:    Diarrhea, unspecified type  -     Comprehensive Metabolic Panel  -     HM1(CBC and Differential)  -     Thyroid Cascade  -     Celiac(Gluten)Antibody Panel  -     Culture, Stool; Future  -     Giardia Detection, Stool; Future  -     Ova and Parasite, Stool; Future  -     C. difficile Toxigenic by PCR; Future  -     HM1 (CBC with Diff)    Vomiting, intractability of vomiting not specified, presence of nausea not specified, unspecified vomiting type  -     Comprehensive Metabolic Panel  -     HM1(CBC and Differential)  -     Thyroid Cascade  -     Pregnancy, Urine  -     Urinalysis-UC if Indicated  -     Celiac(Gluten)Antibody Panel  -     HM1 (CBC with Diff)        DISCUSSION  Patient does not seem to have any signs or symptoms to suggest an ongoing infection at this time.  There are multiple potential etiologies for her chronic digestive system symptoms.  We discussed obtaining testing as noted above to evaluate for potential identifiable cause.  In the meantime patient agrees should there be any worsening symptoms she will seek a more immediate evaluation.  We will consider referring back to her primary care physician to complete work-up and/or gastroenterology pending these initial test results.  Subjective:     HPI    Paulette Harvey is a 19 y.o. female was a history of anxiety, concussion 1 year ago and stress-induced seizures and recent Nexplanon removal on 1/10/2020 is here today concerned regarding vomiting.  She is scheduled appointment stating that she needed clearance to return to work because of an episode of vomiting yesterday.  In further discussion patient reports multiple episodes  of vomiting, at least 10 discrete episodes over the past year.  In addition on further questioning patient reports constant daily diarrhea of 4-7 times per day.  Patient denies any blood in the vomit.  Patient denies fevers or chills.  Patient is uncertain as to the cause.  Patient reports diarrhea has become an every day occurrence.  Patient denies any weight loss.  States her appetite is affected if she has vomiting but is otherwise seemingly stable and her weight has possibly gone up slightly.  Patient reports she is currently taking no medications.  She previously been on treatment for management of anxiety with trazodone, hydroxyzine and Lexapro.  She denies any dizziness or lightheadedness other than with episodes of vomiting.  Patient denies any alcohol drug use or tobacco use.  Patient has not had any work-up for this prior.  Patient reports only taking ibuprofen on rare occasion.    Review of Systems  Complete review of systems is obtained.  Other than the specific considerations noted above complete review of systems is negative.          Objective:   Medications:  Current Outpatient Medications   Medication Sig     albuterol (PROAIR HFA) 90 mcg/actuation inhaler Inhale 2 puffs every 6 (six) hours as needed for wheezing.     albuterol (PROAIR HFA;PROVENTIL HFA;VENTOLIN HFA) 90 mcg/actuation inhaler Inhale 2 puffs every 6 (six) hours as needed for wheezing.     escitalopram oxalate (LEXAPRO) 10 MG tablet Take 10 mg by mouth daily.     hydrOXYzine HCl (ATARAX) 10 MG tablet Take 10 mg by mouth 3 (three) times a day as needed for itching.     traZODone (DESYREL) 100 MG tablet Take 100 mg by mouth at bedtime.       Allergies:  No Known Allergies    Tobacco:   reports that she has never smoked. She has never used smokeless tobacco.     Physical Exam          /62 (Patient Site: Right Arm, Patient Position: Sitting, Cuff Size: Adult Regular)   Pulse 93   Temp 98.1  F (36.7  C)   Wt 165 lb 3.2 oz (74.9 kg)    SpO2 98%   BMI 28.36 kg/m        She is well-appearing 19-year-old female not in any apparent distress.    HEENT: No scleral icterus or conjunctival irritation or lesions mouth pharynx mouth is moist neck supple without lymphadenopathy.    Lungs: Good air movement no wheeze or crackle    Heart: Regular rate and rhythm no murmur    Abdomen: Soft nondistended patient does report some tenderness that seems to be relatively mild in the right lower quadrant of the abdomen no palpable masses organomegaly.    Extremities warm without edema

## 2021-06-05 NOTE — PROGRESS NOTES
Procedure Note-Nexplanon Removal    Paulette Harvey is here for removal of etonogestrel implant Nexplanon/Implanon. Pt reports it was placed 3-4 yrs ago. Wants to stop contraception - shares that she is [place] and therefore not worried about pregnancy.    Indication: desires to stop contraception, completed therapy    Consent: Affirmation of informed consent was signed and scanned into the medical record. Risks, benefits and alternatives were discussed. Patient's questions were elicited and answered.     Procedure safety checklist was completed:  yes  Time Out (Pause for the Cause) completed: yes    Preoperative Diagnosis: etonogestrel implant    Postoperative Diagnosis: etonogestrel implant removed    Technique:   Skin prep betadine  Anesthesia Lidocaine with epi  Procedure: Small incision (<5mm) was made at distal end of palpable implant, curved hemostat was used to isolate the implant and bring it to the incision, the fibrous capsule containing the implant  was incised and the Implant was removed intact.  EBL: minimal  Complications:  none  Tolerance:  Pt tolerated procedure well and was in stable condition.       Follow up: Pt was instructed to call if bleeding, severe pain or foul smell.   Work note provided for missing last weekend d/t acute GI illness.    Rebecca Cardenas MD

## 2021-06-05 NOTE — TELEPHONE ENCOUNTER
----- Message from Patrick Dawson MD sent at 2/6/2020 12:40 PM CST -----  Please call patient - the blood tests show no identifiable cause for her symptoms.  She should return the stool tests.  I also recommend she schedule an appointment with Dr Isaacs soon to discuss her symptoms further.

## 2021-06-07 NOTE — PATIENT INSTRUCTIONS - HE
If you were tested, we will call you with your COVID-19 result. You don't need to call us to check on your result. You can also use the information at the end of this document to sign up for [facility] [employer] where you can get your results and a message about those results sent to you through the [employer] application.    Regardless of if you have been tested or not:  Patient who have symptoms (cough, fever, or shortness of breath), need to isolate for 7 days from when symptoms started AND 72 hours after fever resolves (without fever reducing medications) AND improvement of respiratory symptoms (whichever is longer).      Isolate yourself at home (in own room/own bathroom if possible)    Do Not allow any visitors    Do Not go to work or school    Do Not go to [employer],  centers, shopping, or other public places.    Do Not shake hands.    Avoid close and intimate contact with others (hugging, kissing).    Follow CDC recommendations for household cleaning of frequently touched services.     After the initial 7 days, continue to isolate yourself from household members as much as possible. To continue decrease the risk of community spread and exposure, you and any members of your household should limit activities in public for 14 days after starting home isolation.     You can reference the following CDC link for helpful home isolation/care tips:  https://www.cdc.gov/coronavirus/2019-ncov/downloads/10Things.pdf    Protect Others:    Cover Your Mouth and Nose with a mask, disposable tissue or wash cloth to avoid spreading germs to others.    Wash your hands and face frequently with soap and water    Call Back If: Breathing difficulty develops or you become worse.    For more information about COVID19 and options for caring for yourself at home, please visit the CDC website at https://www.cdc.gov/coronavirus/2019-ncov/about/steps-when-sick.html  For more options for care at [facility], please visit  our website at [email or web address]    For more information, please use the [place] Department of Health COVID-19 Website: https://www.health.[facility].[place].us/diseases/coronavirus/index.html  [place] Department of Health ([facility]) COVID-19 Hotlines (Interpreters available):      Health questions: Phone Number: [phone] or [phone] and Hours: 7 a.m. to 7 p.m.    Schools and  questions: Phone Number: [phone] or [phone] and Hours 7 a.m. to 7 p.m.

## 2021-06-07 NOTE — TELEPHONE ENCOUNTER
RN triage call from patient  She works in a group home.  Temp checked at work Saturday 100.3  No fever since with home monitoring  No shortness of breath no chest pain  Only other symptoms are stuffy nose and losing voice which patient states is normal for her due to seasonal allergies.  Patient was asked by work to either be tested for COVID19 or obtain a note stating she is okay to work.  No known exposures.    Gave disposition for [email or web address] today. Patient was agreeable and will do this today.  Patient will call back with any other questions or concerns.  Delicia Lopez, RN  Care Connection Triage Nurse  8:13 AM  5/4/2020        Reason for Disposition    [1] COVID-19 infection diagnosed or suspected AND [2] mild symptoms (fever, cough) AND [3] no trouble breathing or other complications    [facility] Specific Disposition  - REQUIRED: [facility] Specific Patient Instructions  COVID 19 Nurse Triage Plan/Patient Instructions    Please be aware that novel coronavirus (COVID-19) may be circulating in the community. If you develop symptoms such as fever, cough, or SOB or if you have concerns about the presence of another infection including coronavirus (COVID-19), please contact your health care provider or visit [email or web address].       Patient to have an [facility] Visit with a provider (Preferred option). Follow System Ambulatory Workflow for COVID 19. It is recommended that you setup an [facility] Visit with one of our virtual providers.  To do this follow these instructions:    1. Go to the website [email or web address]  2. Create an account (you will need your insurance information)  3. Start a new visit  4. Choose your diagnosis (e.g. COVID19)  5. Fill out the information about your symptoms  6. A provider will reach out to you by text, phone call or video visit based on your request    Call Back If: Your symptoms worsen before you are able to complete your [facility] Visit with a provider.    Thank you for  limiting contact with others, wearing a simple mask to cover your cough, practice good hand hygiene habits and accessing our virtual services where possible to limit the spread of this virus.    For more information about COVID19 and options for caring for yourself at home, please visit the CDC website at https://www.cdc.gov/coronavirus/2019-ncov/about/steps-when-sick.html  For more options for care at [facility], please visit our website at [email or web address]    For more information, please use the [facility] Health ([facility]) COVID-19 Hotlines (Interpreters available):   Health questions: Phone Number: [phone] or [phone] and Hours: 7 a.m. to 7 p.m.  Schools and  questions: Phone Number: [phone] or [phone] and Hours 7 a.m. to 7 p.m.    Protocols used: CORONAVIRUS (COVID-19) DIAGNOSED OR [id number]-T-WC 4.22.20

## 2021-06-08 NOTE — PROGRESS NOTES
"Paulette Harvey is a 19 y.o. female who is being evaluated via a billable video visit.      The patient has been notified of following:     \"This video visit will be conducted via a call between you and your physician/provider. We have found that certain health care needs can be provided without the need for an in-person physical exam.  This service lets us provide the care you need with a video conversation.  If a prescription is necessary we can send it directly to your pharmacy.  If lab work is needed we can place an order for that and you can then stop by our lab to have the test done at a later time.    Video visits are billed at different rates depending on your insurance coverage. Please reach out to your insurance provider with any questions.    If during the course of the call the physician/provider feels a video visit is not appropriate, you will not be charged for this service.\"    Patient has given verbal consent to a Video visit? Yes    Patient would like to receive their AVS by AVS Preference: Mail a copy.    Patient would like the video invitation sent by: Text to cell phone: [phone]    Will anyone else be joining your video visit? No        Video Start Time: 9:30    Subjective: Patient is evaluated today for follow-up on anxiety and depression as well as recent diagnosis of COVID-19 infection.  We reviewed her medications and allergies and interim health history since she was last seen in clinic.  She is currently working at a group home.  She is living in an apartment with her girlfriend and her girlfriend's mother.  She was noted to have a temp of 100.3 when going to work at the group home and was referred for COVID-19 testing.  She found out yesterday that the COVID-19 test was positive.  She reports that she has been feeling okay.  She has had a little bit of a cough and has been feeling quite fatigued.  Otherwise she did not suspect that she had this infection.  She is not having any " dyspnea.  She has not been referred to the get well loop.  She is now staying home for at least 10 days and her close contacts are being tested for COVID-19.  She admits that she has had a little bit of increased anxiety since she found out about the diagnosis but that has calm down a bit.  She did schedule this appointment prior to being diagnosed with COVID-19.  She does have history of anxiety and depression.  In the past she has taken fluoxetine but then was started on Lexapro by a psychiatrist.  She was doing well on the Lexapro but discontinued this about a year ago because she was having seizure-like episodes.  Ultimately it was determined that the seizure-like episodes were due to anxiety attacks.  She was not having true seizures.  It was not felt that the episodes were caused by the Lexapro.  She never got restarted on the Lexapro and due to ongoing symptoms of anxiety and depression she would like to discuss resuming that medication.  She reports that she has been having sleep troubles as well.  Does not have trouble falling asleep but will wake up frequently during the night and then have trouble and back to sleep.  When she was taking Lexapro she did feel that it works well for her symptoms of depression and anxiety.  She also was prescribed hydroxyzine 10 mg but states that she did not notice much of a benefit with that medication.  Felt that her anxiety past before the medication would even kick in.  She is willing to try it again at a higher dose.  She has no additional concerns or questions.  Review of systems is assessed and is otherwise negative.    Additional provider notes: GENERAL: healthy, alert and no distress  EYES: Eyes grossly normal to inspection, conjunctivae and sclerae normal  RESP: no audible wheeze, cough, or visible cyanosis.  No visible retractions or increased work of breathing.  Able to speak fully in complete sentences.  NEURO: Cranial nerves grossly intact, mentation intact and  speech normal  PSYCH: mentation appears normal, affect normal/bright, judgement and insight intact, normal speech and appearance well-groomed    ESTELA-7: 18  PHQ-9: 19    Paulette was seen today for anxiety and tested positive for covid-19 5/5/2020.    Diagnoses and all orders for this visit:    Moderate recurrent major depression (H)  -     escitalopram oxalate (LEXAPRO) 10 MG tablet; Take 1/2 tablet by mouth daily for 1 week and then increase to 1 tablet by mouth daily  -We will restart Lexapro.  Start 5 mg daily for 1 week and then increase to 10 mg daily.  Counseled her on use of medication and side effects.  Encouraged her to follow-up via virtual visit in about 4 to 5 weeks for medication check.  Provided emotional support.  Encouraged other self cares such as staying connected with friends and family, exercise, adequate rest and healthy diet.    COVID-19 virus infection  -     COVID-19 GetWell Loop Referral; Future  -We will place referral to get [facility] loop for monitoring of symptoms of COVID-19 infection.    Generalized anxiety disorder  -     escitalopram oxalate (LEXAPRO) 10 MG tablet; Take 1/2 tablet by mouth daily for 1 week and then increase to 1 tablet by mouth daily  -     hydrOXYzine HCL (ATARAX) 25 MG tablet; Take 1-2 tablets by mouth every 6 hours as needed for anxiety or sleep   -We will resume Lexapro as discussed above.  Also provided prescription for hydroxyzine 25 mg tablets and directed her to take 1 to 2 tablets every 6 hours as needed for anxiety or sleep.  We will plan to follow-up with her via virtual visit in about 4 to 5 weeks.          Video-Visit Details    Type of service:  Video Visit    Video End Time (time video stopped): 9:42  Originating Location (pt. Location): Home    Distant Location (provider location):  [facility]/OB     Platform used for Video Visit: Radhames Isaacs MD

## 2021-06-08 NOTE — TELEPHONE ENCOUNTER
Left message to call back for: needs f/u video visit with DR Isaacs around June 5th or 6th   Information to relay to patient:  Please help pt schedule video visit

## 2021-06-08 NOTE — TELEPHONE ENCOUNTER
Coronavirus (COVID-19) Notification       Reason for call  Notify of POSITIVE  COVID-19 lab result, assess symptoms,  review [facility] recommendations    Lab Result   Lab test for 2019-nCoV rRt-PCR  nasopharyngeal swabs were POSITIVE for 2019-nCoV RNA [OR] SARS-COV-2 RNA (COVID-19) RNA     We have been unable to reach Patient by phone at this time to notify of their Positive COVID-19 result. Writer found a number patient had left for call back in a triage encounter  The number is [phone]. The voice mail answered and said n of  Shaylee.  Writer left no message.      POSITIVE COVID-19 Letter sent.    Meg Clarke LPN

## 2021-06-08 NOTE — TELEPHONE ENCOUNTER
Left message to call back for: needs f/u video appt with DR Isaacs   Information to relay to patient:  See message below

## 2021-06-08 NOTE — TELEPHONE ENCOUNTER
Noted in chart that patient was notified of positive COVID-19  results with no teaching it appears. HE Triage RN references the notification in charting  The patient did have a virtual appointment with Dr Isaacs this am.  No further action needed per writers manager.

## 2021-06-08 NOTE — TELEPHONE ENCOUNTER
Left message to call back for: pt needs f/u video visit with DR Isaacs  Information to relay to patient:  See message below. This is 4th message left for pt. No return calls.

## 2021-06-08 NOTE — TELEPHONE ENCOUNTER
Left message to call back for: pt  Information to relay to patient:  Left message to call and schedule virtual visit with dr jeffers re: medication

## 2021-06-08 NOTE — TELEPHONE ENCOUNTER
Left message to call back for: pt  nformation to relay to patient: 2nd message to call and schedule virtual visit with dr jeffers re: medication.

## 2021-06-10 ENCOUNTER — OFFICE VISIT - HEALTHEAST (OUTPATIENT)
Dept: FAMILY MEDICINE | Facility: CLINIC | Age: 21
End: 2021-06-10

## 2021-06-10 DIAGNOSIS — F32.1 MODERATE MAJOR DEPRESSION (H): ICD-10-CM

## 2021-06-10 DIAGNOSIS — F41.9 ANXIETY: ICD-10-CM

## 2021-06-10 DIAGNOSIS — E66.3 OVERWEIGHT (BMI 25.0-29.9): ICD-10-CM

## 2021-06-10 DIAGNOSIS — Z30.09 GENERAL COUNSELING AND ADVICE FOR CONTRACEPTIVE MANAGEMENT: ICD-10-CM

## 2021-06-10 DIAGNOSIS — Z00.00 ROUTINE GENERAL MEDICAL EXAMINATION AT A HEALTH CARE FACILITY: ICD-10-CM

## 2021-06-10 ASSESSMENT — ANXIETY QUESTIONNAIRES
IF YOU CHECKED OFF ANY PROBLEMS ON THIS QUESTIONNAIRE, HOW DIFFICULT HAVE THESE PROBLEMS MADE IT FOR YOU TO DO YOUR WORK, TAKE CARE OF THINGS AT HOME, OR GET ALONG WITH OTHER PEOPLE: SOMEWHAT DIFFICULT
4. TROUBLE RELAXING: MORE THAN HALF THE DAYS
6. BECOMING EASILY ANNOYED OR IRRITABLE: MORE THAN HALF THE DAYS
1. FEELING NERVOUS, ANXIOUS, OR ON EDGE: NEARLY EVERY DAY
GAD7 TOTAL SCORE: 14
3. WORRYING TOO MUCH ABOUT DIFFERENT THINGS: MORE THAN HALF THE DAYS
7. FEELING AFRAID AS IF SOMETHING AWFUL MIGHT HAPPEN: SEVERAL DAYS
5. BEING SO RESTLESS THAT IT IS HARD TO SIT STILL: MORE THAN HALF THE DAYS
2. NOT BEING ABLE TO STOP OR CONTROL WORRYING: MORE THAN HALF THE DAYS

## 2021-06-10 ASSESSMENT — PATIENT HEALTH QUESTIONNAIRE - PHQ9: SUM OF ALL RESPONSES TO PHQ QUESTIONS 1-9: 15

## 2021-06-10 ASSESSMENT — MIFFLIN-ST. JEOR: SCORE: 1495.5

## 2021-06-11 NOTE — PATIENT INSTRUCTIONS - HE
Based on the information provided, I would recommend you come in for an appointment to discuss these symptoms. Please schedule an appointment.  This can be a video or telephone visit.     You will not be charged for this eVisit.

## 2021-06-11 NOTE — PROGRESS NOTES
Assessment:   The encounter diagnosis was Diarrhea, unspecified type.     Plan:   No medications were ordered this encounter    Patient Instructions     Based on the information provided, I would recommend you come in for an appointment to discuss these symptoms. Please schedule an appointment.  This can be a video or telephone visit.     You will not be charged for this eVisit.    Return for further follow up if needed. Call [phone]) or schedule an appointment via [employer]..    Subjective:   Paulette Harvey is a 19 y.o. female who submitted an eVisit request for evaluation of her Diarrhea.  See the questionnaire and message section of encounter report for information related to history of present illness and review of systems.    The following portions of the patient's history were reviewed and updated as appropriate:  She does not have any pertinent problems on file.  She has No Known Allergies..     Objective:   No exam performed today, patient submitted as eVisit.  Provider E-Visit time total (minutes): < 5 minutes

## 2021-06-11 NOTE — PROGRESS NOTES
Assessment:   The encounter diagnosis was Vomiting, intractability of vomiting not specified, presence of nausea not specified, unspecified vomiting type.     Plan:   No medications were ordered this encounter    There are no Patient Instructions on file for this visit.  Return for further follow up if needed. Call [phone]) or schedule an appointment via [employer]..    Subjective:   Paulette Harvey is a 19 y.o. female who submitted an eVisit request for evaluation of her No chief complaint on file..  See the questionnaire and message section of encounter report for information related to history of present illness and review of systems.    The following portions of the patient's history were reviewed and updated as appropriate:  She does not have any pertinent problems on file.  She has No Known Allergies..     Objective:   No exam performed today, patient submitted as eVisit.  Provider E-Visit time total (minutes): no charge

## 2021-06-12 NOTE — TELEPHONE ENCOUNTER
Refill Approved    Rx renewed per Medication Renewal Policy. Medication was last renewed on 9/14/2020.  Last office visit 9/14/2020    Urszula Proctor, [facility] Connection Triage/Med Refill 10/8/2020     Requested Prescriptions   Pending Prescriptions Disp Refills     omeprazole (PRILOSEC) 20 MG capsule [Pharmacy Med Name: OMEPRAZOLE DR 20 MG CAPSULE] 30 capsule 1     Sig: TAKE 1 CAPSULE (20 MG TOTAL) BY MOUTH DAILY BEFORE BREAKFAST.       GI Medications Refill Protocol Passed - 10/6/2020 10:31 AM        Passed - PCP or prescribing provider visit in last 12 or next 3 months.     Last office visit with prescriber/PCP: 2/4/2019 Christin Isaacs MD OR same dept: 2/3/2020 Patrick Dawson MD OR same specialty: 2/3/2020 Patrick Dawson MD  Last physical: 11/17/2016 Last MTM visit: Visit date not found   Next visit within 3 mo: Visit date not found  Next physical within 3 mo: Visit date not found  Prescriber OR PCP: Christin Isaacs MD  Last diagnosis associated with med order: 1. Vomiting and diarrhea  - omeprazole (PRILOSEC) 20 MG capsule [Pharmacy Med Name: OMEPRAZOLE DR 20 MG CAPSULE]; Take 1 capsule (20 mg total) by mouth daily before breakfast.  Dispense: 30 capsule; Refill: 1    If protocol passes may refill for 12 months if within 3 months of last provider visit (or a total of 15 months).

## 2021-06-15 NOTE — PROGRESS NOTES
Assessment/Plan:     1. Anxiety         Diagnoses and all orders for this visit:    Anxiety    Other orders  -     FLUoxetine (PROZAC) 20 MG capsule; Take 1 capsule (20 mg total) by mouth daily.  Dispense: 30 capsule; Refill: 2     We will increase dosage of fluoxetine to 20 mg daily.  Discussed that she may experience some side effects with the dosage increase.  Discussed it will take about a month before she can fully assess the effectiveness of this dosage increase.  Contact me if the dosage increase does not seem to be helpful for improving the irritability and then we can discuss the next steps at that time.  Encouraged adequate rest, healthy diet and regular exercise and other stress management techniques.  They are.  Already cutting back a little bit on her activities so that she is not so overwhelmed.  They are comfortable with this plan.        Subjective:      Paulette Harvey is a 17 y.o. female who comes in today accompanied by her mother for follow-up on anxiety and medication check on fluoxetine.  She has been until milligrams daily for a few years.  This was initially started for some irritability and mood swings around the time of her menstrual periods.  The original plan was to increase to 20 mg of fluoxetine however she seemed to experience more fatigue with a 20 mg dose so continued at the 10 mg dose since this seemed to help improve her symptoms.  She is now a marleny in high school.  She attends at [employer].  She is also involved in figure skating.  Participates in both individual and synchronized skating.  She also has a part-time job at [employer].  She has been under a lot of stress and mother and patient have noticed some more irritability and mood swings that she is not as able to control.  This will happen fairly quickly.  They feel like an adjustment in medication is needed and would like to discuss going up on the dose of fluoxetine.  She is doing well in  "school.  She does not feel any symptoms of depression.  She reports that she is sleeping well.  She does not generally feel very anxious or worried about things.  Mood swings do not seem to be related to menstrual cycles.  She has the Nexplanon and does not get regular periods.  We reviewed medications and allergies.  Review of systems is otherwise negative.  No other concerns today.    Current Outpatient Prescriptions   Medication Sig Dispense Refill     etonogestrel (NEXPLANON) 68 mg Impl implant 1 each by Subdermal route once.       FLUoxetine (PROZAC) 20 MG capsule Take 1 capsule (20 mg total) by mouth daily. 30 capsule 2     No current facility-administered medications for this visit.        Past Medical History, Family History, and Social History reviewed.  History reviewed. No pertinent past medical history.  History reviewed. No pertinent surgical history.  Review of patient's allergies indicates no known allergies.  History reviewed. No pertinent family history.  Social History     Social History     Marital status: Single     Spouse name: N/A     Number of children: N/A     Years of education: N/A     Occupational History     Not on file.     Social History Main Topics     Smoking status: Never Smoker     Smokeless tobacco: Never Used     Alcohol use No     Drug use: No     Sexual activity: No     Other Topics Concern     Not on file     Social History Narrative         Review of systems is as stated in HPI, and the remainder of the 10 system review is otherwise negative.    Objective:     Vitals:    01/11/18 1401   BP: 110/72   Patient Site: Left Arm   Patient Position: Sitting   Cuff Size: Adult Regular   Pulse: 78   Resp: 18   SpO2: 99%   Weight: 173 lb 12.8 oz (78.8 kg)   Height: 5' 4\" (1.626 m)    Body mass index is 29.83 kg/(m^2).    General appearance: alert, appears stated age and cooperative  Head: Normocephalic, without obvious abnormality, atraumatic  Neck: no adenopathy and thyroid not " enlarged, symmetric, no tenderness/mass/nodules  Lungs: clear to auscultation bilaterally  Heart: regular rate and rhythm, S1, S2 normal, no murmur, click, rub or gallop  Neurologic: Grossly normal  Psych: mood appropriate, affect normal    Results: PHQ-9: 3   ESTELA-7: 3    This note has been dictated using voice recognition software. Any grammatical or context distortions are unintentional and inherent to the the software.

## 2021-06-16 PROBLEM — F32.1 MODERATE MAJOR DEPRESSION (H): Status: ACTIVE | Noted: 2020-09-14

## 2021-06-16 PROBLEM — F41.9 ANXIETY: Status: ACTIVE | Noted: 2020-09-14

## 2021-06-17 ENCOUNTER — OFFICE VISIT - HEALTHEAST (OUTPATIENT)
Dept: FAMILY MEDICINE | Facility: CLINIC | Age: 21
End: 2021-06-17

## 2021-06-17 DIAGNOSIS — Z30.017 NEXPLANON INSERTION: ICD-10-CM

## 2021-06-17 LAB — HCG UR QL: NEGATIVE

## 2021-06-18 NOTE — LETTER
Letter by Christin Isaacs MD at      Author: Christin Isaacs MD Service: -- Author Type: --    Filed:  Encounter Date: 2/4/2019 Status: (Other)       February 4, 2019     Patient: Paulette Harvey   YOB: 2000   Date of Visit: 2/4/2019       To Whom it May Concern:    Paulette Harvey was seen in my clinic on 2/4/2019. She should not participate in phy ed for 1 week.     If you have any questions or concerns, please don't hesitate to call.    Sincerely,         Electronically signed by Christin Isaacs MD

## 2021-06-19 NOTE — PROGRESS NOTES
Assessment/Plan:     1. Acute cystitis without hematuria  Prescription provided for Bactrim DS for 3 days.  Will await urine culture results.  Will await gonorrhea chlamydia screening as well.  Notify with lack of resolution or onset of additional symptoms.  - Urinalysis-UC if Indicated  - Chlamydia trachomatis & Neisseria gonorrhoeae, Amplified Detection  - Culture, Urine      Subjective:      Paulette Harvey is a 17 y.o. female presented to clinic today for evaluation of urinary discomfort, burning in quality, and some urgency with only small amounts.  On occasion has had a small amount of incontinence as well.  She has a history of recurrent UTIs as a child, has not had any recently.  Denies fevers or chills, no back pain.  No new sexual partners.  Has Nexplanon for contraception, also using condoms.  Agreeable to gonorrhea chlamydia screening.    Current Outpatient Prescriptions   Medication Sig Dispense Refill     etonogestrel (NEXPLANON) 68 mg Impl implant 1 each by Subdermal route once.       FLUoxetine (PROZAC) 20 MG capsule Take 1 capsule (20 mg total) by mouth daily. 90 capsule 0     No current facility-administered medications for this visit.        Past Medical History, Family History, and Social History reviewed.  No past medical history on file.  No past surgical history on file.  Review of patient's allergies indicates no known allergies.  No family history on file.  Social History     Social History     Marital status: Single     Spouse name: N/A     Number of children: N/A     Years of education: N/A     Occupational History     Not on file.     Social History Main Topics     Smoking status: Never Smoker     Smokeless tobacco: Never Used     Alcohol use No     Drug use: No     Sexual activity: No     Other Topics Concern     Not on file     Social History Narrative         Review of systems is as stated in HPI, and the remainder of the 10 system review is otherwise negative.    Objective:  "    Vitals:    07/17/18 1107   BP: 118/70   Patient Site: Right Arm   Patient Position: Sitting   Cuff Size: Adult Regular   Pulse: 64   Resp: 20   Temp: 98  F (36.7  C)   TempSrc: Oral   Weight: 165 lb 12.8 oz (75.2 kg)   Height: 5' 4\" (1.626 m)    Body mass index is 28.46 kg/(m^2).    Alert female.  No CVA tenderness.  Very mild suprapubic tenderness.    Office Visit on 07/17/2018   Component Date Value Ref Range Status     Color, UA 07/17/2018 Yellow  Colorless, Yellow, Straw, Light Yellow Final     Clarity, UA 07/17/2018 Clear  Clear Final     Glucose, UA 07/17/2018 Negative  Negative Final     Bilirubin, UA 07/17/2018 Negative  Negative Final     Ketones, UA 07/17/2018 Negative  Negative Final     Specific Gravity, UA 07/17/2018 1.020  1.005 - 1.030 Final     Blood, UA 07/17/2018 Negative  Negative Final     pH, UA 07/17/2018 7.0  5.0 - 8.0 Final     Protein, UA 07/17/2018 Negative  Negative mg/dL Final     Urobilinogen, UA 07/17/2018 0.2 E.U./dL  0.2 E.U./dL, 1.0 E.U./dL Final     Nitrite, UA 07/17/2018 Negative  Negative Final     Leukocytes, UA 07/17/2018 Small* Negative Final     Bacteria, UA 07/17/2018 Few* None Seen hpf Final     RBC, UA 07/17/2018 None Seen  None Seen, 0-2 hpf Final     WBC, UA 07/17/2018 5-10* None Seen, 0-5 hpf Final     Squam Epithel, UA 07/17/2018 5-10* None Seen, 0-5 lpf Final     Chlamydia trachomatis, Amplified D* 07/17/2018 Negative  Negative Final     Neisseria gonorrhoeae, Amplified D* 07/17/2018 Negative  Negative Final     Culture 07/17/2018 50,000-100,000 col/ml Escherichia coli*  Final          This note has been dictated using voice recognition software. Any grammatical or context distortions are unintentional and inherent to the the software.   "

## 2021-06-20 NOTE — LETTER
Letter by Patrick Dawson MD at      Author: Patrick Dawson MD Service: -- Author Type: --    Filed:  Encounter Date: 2/10/2020 Status: (Other)         Paulette Harvey  [address] [place] [id number]             February 10, 2020         Dear Ms. Harvey,    Below are the results from your recent visit:    Resulted Orders   Comprehensive Metabolic Panel   Result Value Ref Range    Sodium 141 136 - 145 mmol/L    Potassium 4.1 3.5 - 5.0 mmol/L    Chloride 106 98 - 107 mmol/L    CO2 26 22 - 31 mmol/L    Anion Gap, Calculation 9 5 - 18 mmol/L    Glucose 82 70 - 125 mg/dL    BUN 11 8 - 22 mg/dL    Creatinine 0.72 0.60 - 1.10 mg/dL    GFR MDRD Af Amer >60 >60 mL/min/1.73m2    GFR MDRD Non Af Amer >60 >60 mL/min/1.73m2    Bilirubin, Total 0.3 0.0 - 1.0 mg/dL    Calcium 10.0 8.5 - 10.5 mg/dL    Protein, Total 7.2 6.0 - 8.0 g/dL    Albumin 4.4 3.5 - 5.0 g/dL    Alkaline Phosphatase 48 45 - 120 U/L    AST 13 0 - 40 U/L    ALT 18 0 - 45 U/L    Narrative    Fasting Glucose reference range is 70-99 mg/dL per  American Diabetes Association (ADA) guidelines.   Thyroid Cascade   Result Value Ref Range    TSH 1.20 0.30 - 5.00 uIU/mL   Pregnancy, Urine   Result Value Ref Range    Pregnancy Test, Urine Negative Negative    Narrative    This test is for screening purposes. Results should be interpreted along with the clinical picture. Confirmation testing is available if warranted by ordering Test 143, Beta HCG, Quantitative.   Urinalysis-UC if Indicated   Result Value Ref Range    Color, UA Yellow Colorless, Yellow, Straw, Light Yellow    Clarity, UA Clear Clear    Glucose, UA Negative Negative    Bilirubin, UA Negative Negative    Ketones, UA Negative Negative    Specific Gravity, UA 1.025 1.005 - 1.030    Blood, UA Negative Negative    pH, UA 6.0 5.0 - 8.0    Protein, UA Negative Negative mg/dL    Urobilinogen, UA 0.2 E.U./dL 0.2 E.U./dL, 1.0 E.U./dL    Nitrite, UA Negative Negative    Leukocytes, UA Negative Negative     Narrative    Microscopic not indicated  UC not indicated   Celiac(Gluten)Antibody Panel   Result Value Ref Range    Gliadin IgA 1.1 0.0-<7.0 U/mL    Gliadin IgG 0.7 0.0-<7.0 U/mL    Tissue Transglutaminase IgG AB <0.6 0.0-<7.0 U/mL    Tissue Transglutaminase IgA AB 0.2 0.0-<7.0 U/mL    Immunoglobulin A 141 65 - 400 mg/dL    Narrative      < 7 U/mL = Negative    7-10 U/mL = Equivocal    > 10 U/mL = Positive    Positive results for the tTG and/or gliadin antibodies indicate possible celiac disease and a small intestinal biopsy my be indicated. Antibody levels decrease in patients on gluten-free diets; therefore, negative results do not exclude celiac disease. Total serum IgA is measured to identify selective IgA deficiency, which is present in up to 10% of celiac disease patients. Such patients would have negative results on IgA assays, but may have positive results on IgG antibody assays.   HM1 (CBC with Diff)   Result Value Ref Range    WBC 8.0 4.0 - 11.0 thou/uL    RBC 4.32 3.80 - 5.40 mill/uL    Hemoglobin 14.5 12.0 - 16.0 g/dL    Hematocrit 42.5 35.0 - 47.0 %    MCV 98 80 - 100 fL    MCH 33.5 27.0 - 34.0 pg    MCHC 34.1 32.0 - 36.0 g/dL    RDW 11.7 11.0 - 14.5 %    Platelets 289 140 - 440 thou/uL    MPV 7.3 7.0 - 10.0 fL    Neutrophils % 59 50 - 70 %    Lymphocytes % 33 20 - 40 %    Monocytes % 5 2 - 10 %    Eosinophils % 2 0 - 6 %    Basophils % 1 0 - 2 %    Neutrophils Absolute 4.7 2.0 - 7.7 thou/uL    Lymphocytes Absolute 2.6 0.8 - 4.4 thou/uL    Monocytes Absolute 0.4 0.0 - 0.9 thou/uL    Eosinophils Absolute 0.1 0.0 - 0.4 thou/uL    Basophils Absolute 0.1 0.0 - 0.2 thou/uL        The blood tests show no identifiable cause for your symptoms.  You should return the stool tests.  I  also recommend you schedule an appointment with Dr Isaacs soon to discuss your symptoms further.    Please call with questions or contact us using [employer].    Sincerely,        Electronically signed by Patrick Dawson MD

## 2021-06-20 NOTE — LETTER
Letter by Patrick Dawson MD at      Author: Patrick Dawson MD Service: -- Author Type: --    Filed:  Encounter Date: 2/3/2020 Status: (Other)         February 3, 2020     Patient: Paulette Harvey   YOB: 2000   Date of Visit: 2/3/2020       To Whom It May Concern:    It is my medical opinion that Paulette Harvey may return to full duty immediately with no restrictions.  She developed an illness yesterday that required her to be out of work.  She can now return without any concern.    If you have any questions or concerns, please don't hesitate to call.    Sincerely,        Electronically signed by Patrick Dawson MD

## 2021-06-20 NOTE — LETTER
Letter by Meg Clarke LPN at      Author: Meg Clarke LPN Service: -- Author Type: --    Filed:  Encounter Date: 5/6/2020 Status: (Other)       5/6/2020        Paulette Harvey  [address] [facility] [address]    This letter provides a written record that you were tested for COVID-19 on 5/4/2020.     Your result was positive.     This means that we found the virus that causes COVID-19 in your sample.    How can I protect others?    For safety, its very important to follow these rules.    First, stay home and away from others (self-isolate) until:      Youve had no fever--and no medicine that reduces fever--for 3 full days (72 hours). And?     Your other symptoms have gotten better. For example, your cough or breathing has improved. And?    At least 7 days have passed since your symptoms started.    During this time:    Dont go to work, school or anywhere else.     Stay away from others in your home. No hugging, kissing or shaking hands.    Dont let anyone visit.    Cover your mouth and nose with a mask, tissue or wash cloth to avoid spreading germs.    Wash your hands and face often with soap and water.    You should not go back to work until you meet the guidelines above for ending your home isolation. You should meet these along with any other guidelines that your employer has.    Employers: This document serves as formal notice of your employees medical guidelines for going back to work. They must meet the above guidelines before going back to work in person.    How can I take care of myself?    1. Take Tylenol (acetaminophen) for fever or pain. If you have liver or kidney problems, ask your family doctor if its okay to take Tylenol.     Take either:     650 mg (two 325 mg pills) every 4 to 6 hours, or?    1,000 mg (two 500 mg pills) every 8 hours as needed.     Note: Dont take more than 3,000 mg in one day.    2. If you have other health problems (like cancer, heart failure, an organ transplant  or severe kidney disease): Call your specialty clinic if you dont feel better in the next 2 days.    3. Know when to call 911: If your breathing is so bad that it keeps you from doing normal activities, call 911 or go to the emergency room. Tell them that youve been staying home and may have COVID-19.    4. Sign up for [employer]. We know its scary to hear that you have COVID-19. We want to track your symptoms to make sure youre okay over the next 2 weeks. Please look for an email from [employer]--this is a free, online program that well use to keep in touch. To sign up, follow the link in the email. Learn more at [email or web address].    5. Interested is participating in research? Visit the link below to view current clinical trials that apply to your situation:  [email or web address]    Where can I get more information?    To learn the [facility] guidelines for staying home, please visit the [facility] website at [email or web address].    To learn more about COVID-19 and how to care for yourself at home, please visit the CDC website at https://www.cdc.gov/coronavirus/2019-ncov/about/steps-when-sick.html.    For more options for care at [facility], please visit our website at [email or web address]

## 2021-06-20 NOTE — LETTER
Letter by Christin Isaacs MD at      Author: Christin Isaacs MD Service: -- Author Type: --    Filed:  Encounter Date: 6/4/2020 Status: (Other)         Paulette Harvey  [address]  [place] [address]      June 4, 2020      Dear Paulette Harvey,    Per our refill protocol, you are due for a medication check office visit. Your prescription for Atarax was sent to your pharmacy 06.02.2020). Please call [phone] to schedule an office visit with Dr. Isaacs before your next refill is needed to avoid delays.    Thank you,  [facility]

## 2021-06-20 NOTE — LETTER
Letter by Christin Isaacs MD at      Author: Christin Isaacs MD Service: -- Author Type: --    Filed:  Encounter Date: 8/31/2020 Status: (Other)         August 31, 2020     Patient: Paulette Harvey   YOB: 2000   Date of Visit: 8/29/2020       To Whom It May Concern:    It is my medical opinion that Paulette Harvey should be excused from work on 8/29/2020 due to illness.    If you have any questions or concerns, please don't hesitate to call.    Sincerely,        Electronically signed by Christin Isaacs MD

## 2021-06-20 NOTE — LETTER
Letter by Christin Isaacs MD at      Author: Christin Isaacs MD Service: -- Author Type: --    Filed:  Encounter Date: 9/7/2020 Status: (Other)         September 7, 2020     Patient: Paulette Harvey   YOB: 2000   Date of Visit: 9/7/2020       To Whom It May Concern:    It is my medical opinion that Paulette Harvey should be excused from work on 9/6/2020 due to illness.    If you have any questions or concerns, please don't hesitate to call.    Sincerely,        Electronically signed by Christin Isaacs MD

## 2021-06-20 NOTE — LETTER
Letter by Rebecca Cardenas MD at      Author: Rebecca Cardenas MD Service: -- Author Type: --    Filed:  Encounter Date: 1/10/2020 Status: Signed         January 10, 2020     Patient: Paulette Harvey   YOB: 2000   Date of Visit: 1/10/2020       To Whom It May Concern:     Paulette Harvey  Was seen today for medical concern. Please excuse her from work 1/3-1/6/2020 due to acute illness. She is safe to return to [facility] at this time.    If you have any questions or concerns, please don't hesitate to call.    Sincerely,         Rebecca Cardenas MD

## 2021-06-23 NOTE — PROGRESS NOTES
Assessment/Plan:     1. Seizures (H)  Ambulatory referral to Neurology    CT Head Without Contrast   2. Concussion without loss of consciousness, initial encounter  Ambulatory referral to Concussion Clinic   3. Marijuana use, continuous     4. ESTELA (generalized anxiety disorder)     5. Episode of recurrent major depressive disorder, unspecified depression episode severity (H)         Diagnoses and all orders for this visit:    Seizures (H)  -     Ambulatory referral to Neurology  -     CT Head Without Contrast  -Recommend referral to neurology for further evaluation.  Advised no driving until seizures are further evaluated.  Will obtain head CT due to new onset of seizures as well as reported history of abrupt changes in personality.  Discussed recommendations with patient and via telephone with patient's mother.  Both patient and mother were comfortable with this plan    Concussion without loss of consciousness, initial encounter  -     Ambulatory referral to Concussion Clinic  -Discussed management of concussion.  Encouraged cognitive and physical rest.  Avoid electronics for 24-48 hours.  Discussed gradual return to activities.  Discussed accommodations she may need in school.  Provided note to remain out of gym class for 1 week.    Marijuana use, continuous  Advised abstinence    ESTELA (generalized anxiety disorder)  Continue Lexapro.  Continue hydroxyzine as needed    Episode of recurrent major depressive disorder, unspecified depression episode severity (H)  Continue Lexapro.           Subjective:      Paulette Harvey is a 18 y.o. female who comes in today accompanied by a friend for evaluation of recent episodes of seizures as well as fall with injury to head.  Reviewed her health history.  Reviewed medications and allergies and updated the chart.  She was seen by a psychiatrist in December who started her on escitalopram in place of fluoxetine for treatment of anxiety and depression.  She was also started  on trazodone and hydroxyzine.  She reports no other significant changes in her health.  She is no longer attending school at [employer] and is back at [employer].  She is no longer involved in a skating.  States that last summer she had about 3 occasions where she had what she describes as seizures.  She is not able to provide a lot of information about what happened but describes jolting body movements and then no recollection of that after the event occurred.  She does not identify any triggers but thinks that they may have occurred when she was feeling more stressed.  Most recently she had an episode on Saturday which is 2 days ago.  She was with a friend.  The friend is here today and reports that she was [facility]  She turned around to see patient on the floor.  Her eyes were open and she was staring at the ceiling.  She had jolting body movements about every 5 seconds for about 15 seconds.  Afterward patient was confused and did not remember what had happened.  She did hit her head on the floor.  Since then she has had a headache as well as nausea.  She has not had vomiting.  She has not had vision changes.  Hearing is okay.  She has had some ringing in her ears.  Feels a little lightheaded.  She is sensitive to light.  She feels tired.  No prior history of concussions.  She did not go to school today.  She reports that she is taking her medications regularly.  She does report history of marijuana use.  Review of systems is assessed and is otherwise negative.  No other or questions today.  Contacted patient's mother via phone after the office visit for some additional information.  Patient gave permission for me to speak with her mother.  Mother reports that in September, patient seemed to have some abrupt changes in her personality.  Started dressing and acting differently.  This is around the time she became friends with the person who was in the clinic today.   Mother describes this person is very controlling.  Mother states that patient has not been living with her for past 6 weeks.  Mother reports that patient has been pretty consistently using marijuana.  She is not aware of any other drug or alcohol use.  She does question whether patient is consistently taking Lexapro as patient had history of not taking fluoxetine regularly in the past despite telling her mother she had taken the medication.  Mother is not sure if this could have been having an effect on patient's seizure-like episodes.    Current Outpatient Medications   Medication Sig Dispense Refill     escitalopram oxalate (LEXAPRO) 10 MG tablet Take 10 mg by mouth daily.       etonogestrel (NEXPLANON) 68 mg Impl implant 1 each by Subdermal route once.       hydrOXYzine HCl (ATARAX) 10 MG tablet Take 10 mg by mouth 3 (three) times a day as needed for itching.       traZODone (DESYREL) 100 MG tablet Take 100 mg by mouth at bedtime.       No current facility-administered medications for this visit.        Past Medical History, Family History, and Social History reviewed.  No past medical history on file.  No past surgical history on file.  Patient has no known allergies.  No family history on file.  Social History     Socioeconomic History     Marital status: Single     Spouse name: Not on file     Number of children: Not on file     Years of education: Not on file     Highest education level: Not on file   Social Needs     Financial resource strain: Not on file     Food insecurity - worry: Not on file     Food insecurity - inability: Not on file     Transportation needs - medical: Not on file     Transportation needs - non-medical: Not on file   Occupational History     Not on file   Tobacco Use     Smoking status: Never Smoker     Smokeless tobacco: Never Used   Substance and Sexual Activity     Alcohol use: No     Drug use: No     Sexual activity: No   Other Topics Concern     Not on file   Social History  "Narrative     Not on file         Review of systems is as stated in HPI, and the remainder of the 10 system review is otherwise negative.    Objective:     Vitals:    02/04/19 1315   BP: 110/60   Patient Site: Left Arm   Patient Position: Sitting   Cuff Size: Adult Regular   Pulse: 56   Weight: 148 lb 11.2 oz (67.4 kg)   Height: 5' 4\" (1.626 m)    Body mass index is 25.52 kg/m .    General appearance: alert, appears stated age and cooperative  Head: Normocephalic, without obvious abnormality, atraumatic  Eyes: conjunctivae/corneas clear. PERRL, EOM's intact. Fundi benign.  Ears: normal TM's and external ear canals both ears  Nose: Nares normal. Septum midline. Mucosa normal. No drainage or sinus tenderness.  Lungs: clear to auscultation bilaterally  Heart: regular rate and rhythm, S1, S2 normal, no murmur, click, rub or gallop  Neurologic: Grossly normal, speech clear, affect normal, reflexes normal, able to get on exam table without difficulty, CN II-XII grossly intact          This note has been dictated using voice recognition software. Any grammatical or context distortions are unintentional and inherent to the the software.       "

## 2021-06-23 NOTE — TELEPHONE ENCOUNTER
----- Message from Christin Isaacs MD sent at 2/11/2019  7:07 AM CST -----  Please let pt know that head CT is normal. Recommend follow up with neurologist and concussion clinic as planned for further evaluation.

## 2021-06-23 NOTE — TELEPHONE ENCOUNTER
Mother Siobhan given results below. States she has an appointment with the concussion clinic but cannot be seen until March. States she is on a cancellation  List with hopes of getting in sooner.

## 2021-06-23 NOTE — TELEPHONE ENCOUNTER
"Mother (Siobhan) is the caller.  Not currently an authorized representative on pt's chart.  Not currently with pt.  Reports pt is texting her that she \"fainted a few days ago and still doesn't feel we..\"    Explained importance of speaking with pt directly.  Mother states \"She doesn't have a phone right now; she's texting me through a friend.\"  Advised mother to have pt call Triage directly.  Mother agrees to do so.  New encounter can be started when pt calls directly.  "

## 2021-06-25 NOTE — PROGRESS NOTES
Bedside EEG was performed that included photic stimulation and 3 minutes of hyperventilation. The patient was both awake during the recording.

## 2021-06-25 NOTE — PROGRESS NOTES
Progress Notes by Juan Brooks DO at 3/6/2017  6:50 PM     Author: Juan Brooks DO Service: -- Author Type: Physician    Filed: 3/7/2017 11:10 AM Encounter Date: 3/6/2017 Status: Signed    : Juan Brooks DO (Physician)       Chief Complaint   Patient presents with   ? Sore Throat     Started 03/03/2017        History of Present Illness: Nursing notes reviewed. Patient has been exposed to strep throat infection. She felt sweaty and cold earlier today. She was coughing earlier today. At exam her only complaint is that of a sore throat.    Review of systems: See history of present illness, otherwise negative.     Current Outpatient Prescriptions   Medication Sig Dispense Refill   ? BIOTIN ORAL Take by mouth.     ? ERGOCALCIFEROL, VITAMIN D2, (VITAMIN D2 ORAL) Take by mouth.     ? etonogestrel (NEXPLANON) 68 mg Impl implant 1 each by Subdermal route once.     ? FLUoxetine (PROZAC) 10 MG capsule TAKE ONE CAPSULE BY MOUTH ONE TIME DAILY 90 capsule 3     No current facility-administered medications for this visit.        No past medical history on file.   No past surgical history on file.   Social History     Social History   ? Marital status: Single     Spouse name: N/A   ? Number of children: N/A   ? Years of education: N/A     Social History Main Topics   ? Smoking status: Never Smoker   ? Smokeless tobacco: None   ? Alcohol use No   ? Drug use: No   ? Sexual activity: No     Other Topics Concern   ? None     Social History Narrative       History   Smoking Status   ? Never Smoker   Smokeless Tobacco   ? Not on file      Exam:   Blood pressure 96/72, pulse 66, temperature 98.3  F (36.8  C), temperature source Oral, resp. rate 14, weight 159 lb 14.4 oz (72.5 kg), SpO2 99 %, not currently breastfeeding.    EXAM:   General: Vital signs reviewed. Patient is in no acute appearing distress with no coughing at exam. Breathing is non labored appearing. Patient is alert and oriented x 3.   ENT: Tympanic membranes  are clear and without injection bilaterally, nasal turbinates show no injection or rhinorrhea, there is mild pharyngeal injection with white exudate, with tonsils being about 2+ size.  Neck: supple with tender adenopathy  Heart: Normal rate and rhythm without murmur  Lungs: Clear to auscultation with good air flow bilaterally.  Skin: warm and dry    Recent Results (from the past 24 hour(s))   Rapid Strep A Screen-Throat   Result Value Ref Range    Rapid Strep A Antigen No Group A Strep detected No Group A Strep detected    Results from exam reviewed with parent and patient.    Assessment/Plan   1. Throat pain  Rapid Strep A Screen-Throat    Group A Strep, RNA Direct Detection, Throat   2. Pharyngitis         Patient Instructions     Also see info below. We will notify you of the pending strep study, and treat if indicted.  When Your Child Has Pharyngitis or Tonsillitis  Your brenda throat feels sore. This is likely due to inflammation (redness and swelling) of the throat. Two areas of the throat are most often affected: the pharynx and tonsils. Pharyngitis (inflammation of the pharynx) and tonsillitis (inflammation of the tonsils) are very common in children. This sheet tells you what you can do to relieve your brenda throat pain.    What causes pharyngitis or tonsillitis?  Most commonly, pharyngitis and tonsillitis are caused by a viral or bacterial infection.  What are the symptoms of pharyngitis or tonsillitis?  The main symptom of both conditions is a sore throat. Your child may also have a fever, redness or swelling of the throat, and trouble swallowing.  How is pharyngitis or tonsillitis diagnosed?  The health care provider will examine your brenda throat. The health care provider might swab (wipe) your bredna throat. This swab will be tested for the bacteria that causes an infection called strep throat. If needed, a blood test can be done to check for a viral infection, such as mononucleosis.  How is  pharyngitis or tonsillitis treated?  If your brenda sore throat is caused by a bacterial infection, the health care provider may prescribe antibiotics. Otherwise, you can treat your brenda sore throat at home. To do this:    Give your child acetaminophen or ibuprofen to ease the pain. Do not use ibuprofen in children younger than 6 months of age or in children who are dehydrated or vomiting all of the time. Dont give your child aspirin to relieve a fever. Using aspirin to treat a fever in children could cause a serious condition called Reyes syndrome.    Give your child cool liquids to drink.    Have your child gargle with warm saltwater if it helps relieve pain. An over-the-counter throat numbing spray may also help.  What are the long-term concerns?  If your child has frequent sore throats, take him or her to see a healthcare provider. Removing the tonsils may help relieve your brenda recurring problems.  Call your brenda health care provider right away if your otherwise healthy child has any of the following:    Fever:    In an infant under 3 months old, a rectal temperature of 100.4 F (38.0 C) or higher    In a child of any age who has a repeated temperature of 104 F (40 C) or higher    A fever that lasts more than 24-hours in a child under 2 years old, or for 3 days in a child 2 years or older    Your child has had a seizure caused by the fever    Sore throat pain that persists for 2 to 3 days    Sore throat with fever, headache, stomachache, or rash    Difficulty turning or straightening the head    Problems swallowing; drooling    Trouble breathing or needing to lean forward to breathe    Problems opening mouth fully     8448-1412 [employer]. [address] [place] [address], [place] [address]. All rights reserved. This information is not intended as a substitute for professional medical care. Always follow your healthcare professional's instructions.           Juan Brooks, DO

## 2021-06-26 NOTE — PROGRESS NOTES
Procedure Note-Nexplanon Insertion    Paulette Harvey is here for placement of etonogestrel implant (Nexplanon).    Indication:Contraception    Consent: Affirmation of informed consent was signed and scanned into the medical record. Risks, benefits and alternatives were discussed. Patient's questions were elicited and answered.     Procedure safety checklist was completed:  yes  Time Out (Pause for the Cause) completed: yes    Labs: UPT:  negative    LMP: Patient's last menstrual period was 06/14/2021.    Previous Contraception: none currently, had nexplanon in the psat               Counseling:  Pt. counseled on potential side effects of  Nexplanon, including unpredictable spotting/bleeding and plan for removal/replacement of Nexplanon in 3 years or less.      Preoperative Diagnosis: desires effective contraception    Postoperative Diagnosis: etonogestrel implant in place    Skin prep betadine    Anesthesia Lidocaine with epi    Technique:   Patient was placed supine with left arm exposed.  Ludwin was made 8cm above medial epicondial and a guiding ludwin 4 cm above the first; used old scar/insertion site.  Arm was prepped with betadine. Insertion point was anesthetized with 3mL of 1% lidocaine. After stretching the skin with thumb and index finger around the insertion site, skin punctured with the tip of the needle inserted at 30 degrees and then lowered to horizontal position. While lifting the skin with the tip of the needle, slid the needle to it's full length. Applicator was then stabilized and the purple slider was unlocked by pushing it slightly down. Slider moved back completely until it stopped. Applicator was then removed. Correct placement of the implant was confirmed by palpation in the patient's arm and visualizing the purple top of the obturator.  Insertion site was dressed with an adhesive covered by a pressure dressing.      User card and patient chart label filled out. User card  given to patient for  record. Nexplanon added to medication list     Lot# [[id number]]    EBL: minimal  Complications:  none  Tolerance:  Pt tolerated procedure well and was in stable condition.     Follow up: Pt was instructed to call if bleeding, severe pain or foul smell.     Rebecca Cardenas MD

## 2021-06-26 NOTE — PATIENT INSTRUCTIONS - HE
Mood:  -recommend having another visit at [facility]  -consider restarting therapy/counseling - referral placed to our therapists, ask about insurance coverage    Schedule nexplanon when able - even next week would work

## 2021-07-05 PROBLEM — Z97.5 NEXPLANON IN PLACE: Status: ACTIVE | Noted: 2021-06-17

## 2021-07-06 VITALS
SYSTOLIC BLOOD PRESSURE: 110 MMHG | WEIGHT: 163.25 LBS | HEART RATE: 85 BPM | BODY MASS INDEX: 27.87 KG/M2 | DIASTOLIC BLOOD PRESSURE: 70 MMHG | HEIGHT: 64 IN

## 2021-07-06 VITALS
WEIGHT: 161.31 LBS | BODY MASS INDEX: 27.69 KG/M2 | SYSTOLIC BLOOD PRESSURE: 112 MMHG | DIASTOLIC BLOOD PRESSURE: 57 MMHG | HEART RATE: 80 BPM

## 2021-07-06 ASSESSMENT — PATIENT HEALTH QUESTIONNAIRE - PHQ9: SUM OF ALL RESPONSES TO PHQ QUESTIONS 1-9: 15

## 2021-07-08 ASSESSMENT — ANXIETY QUESTIONNAIRES: GAD7 TOTAL SCORE: 14

## 2021-07-14 NOTE — PROGRESS NOTES
"Paulette Harvey is a 19 y.o. female who is being evaluated via a billable video visit.      The patient has been notified of following:     \"This video visit will be conducted via a call between you and your physician/provider. We have found that certain health care needs can be provided without the need for an in-person physical exam.  This service lets us provide the care you need with a video conversation.  If a prescription is necessary we can send it directly to your pharmacy.  If lab work is needed we can place an order for that and you can then stop by our lab to have the test done at a later time.    Video visits are billed at different rates depending on your insurance coverage. Please reach out to your insurance provider with any questions.    If during the course of the call the physician/provider feels a video visit is not appropriate, you will not be charged for this service.\"    Patient has given verbal consent to a Video visit? Yes  How would you like to obtain your AVS? AVS Preference: [employer].  If dropped by the video visit, the video invitation should be sent to: Text to cell phone: [phone]  Will not be using the [employer],   Will anyone else be joining your video visit? No        Video Start Time: 8:47        Assessment/Plan:     1. Vomiting and diarrhea  omeprazole (PRILOSEC) 20 MG capsule    H. pylori Antigen, Stool(HPSAG)    C. difficile Toxigenic by PCR    Ova and Parasite, Stool    Giardia Detection, Stool    Culture, Stool    Ambulatory referral to Gastroenterology   2. Moderate major depression (H)  escitalopram oxalate (LEXAPRO) 20 MG tablet   3. Anxiety  escitalopram oxalate (LEXAPRO) 20 MG tablet       Diagnoses and all orders for this visit:    Vomiting and diarrhea  -     omeprazole (PRILOSEC) 20 MG capsule; Take 1 capsule (20 mg total) by mouth daily before breakfast.  Dispense: 30 capsule; Refill: 1  -     H. pylori Antigen, Stool(HPSAG); Future; Expected date: 09/15/2020  -     " C. difficile Toxigenic by PCR; Future; Expected date: 09/15/2020  -     Ova and Parasite, Stool; Future; Expected date: 09/15/2020  -     Giardia Detection, Stool; Future; Expected date: 09/15/2020  -     Culture, Stool; Future; Expected date: 09/15/2020  -     Ambulatory referral to Gastroenterology  -We will have her come in to obtain collection containers for above stool studies.  Will evaluate for infectious etiology of symptoms.  We will optimize control of her anxiety and depression by increasing dose of escitalopram to 20 mg daily.  Continue hydroxyzine as needed.  We will start omeprazole 20 mg daily.  We will also place referral to gastroenterology for consultation.  Discussed that further evaluation with upper endoscopy and possible colonoscopy may be needed if symptoms are not improved with omeprazole and if additional stool tests are negative.    Moderate major depression (H)  -     escitalopram oxalate (LEXAPRO) 20 MG tablet; Take 1 tablet (20 mg total) by mouth daily.  Dispense: 90 tablet; Refill: 3  -We will increase dose of escitalopram to 20 mg daily    Anxiety  -     escitalopram oxalate (LEXAPRO) 20 MG tablet; Take 1 tablet (20 mg total) by mouth daily.  Dispense: 90 tablet; Refill: 3  --We will increase dose of escitalopram to 20 mg daily  -Continue hydroxyzine as needed             The following are part of a depression follow up plan for the patient:  mental health care management    Subjective:      Paulette Harvey is a 19 y.o. female who is evaluated today via video encounter for concern of vomiting and diarrhea.  She reports that symptoms have been going on for over a year.  She has diarrhea daily.  Usually 4-5 times per day.  Very rarely will have a normal bowel movement.  States that stool is brown and watery in appearance.  It is never black or tarry looking.  She never sees any blood.  She states that it does smell bad.  She will occasionally have some shooting pains in her abdomen but  states this is pretty rare.  Generally does not have any abdominal discomfort or cramping associated with the diarrhea.  She has not noticed any correlation between diet and her diarrhea.  Feels that she is probably lactose intolerant and avoids dairy products.  She has tried cutting out different types of foods such as gluten but this does not seem to make a difference.  She thought it could be related to anxiety but she is on escitalopram 10 mg daily and uses hydroxyzine as needed.  She has not had weight loss.  In fact has had some weight gain and attributes this to her diet.  States her appetite is good.  She has not had fevers or chills.  She has not had any medication changes.  States that about once a week she will also experience very forceful vomiting.  This is associated with discomfort.  States that she typically vomits up food and bile.  She does not notice any acid reflux symptoms.  States her throat will be somewhat sore after she has an episode of vomiting.  She did see Dr. Dawson regarding these concerns in February.  Labs were ordered and were normal.  This included a normal thyroid, comprehensive metabolic panel, celiac test as well as normal hemogram.  Stool studies were also ordered but patient reports that her dog got into the collection containers and she did not know how to get new ones.  She has missed work on several occasions due to her vomiting and diarrhea symptoms.  She reports there is no family history of celiac disease or other GI problems that she is aware of.  She reports that symptoms have not really changed since she was seen in February by Dr. Dawson.  She has no other concerns or questions.  Review of systems is otherwise negative.    Current Outpatient Medications   Medication Sig Dispense Refill     hydrOXYzine HCL (ATARAX) 25 MG tablet TAKE 1-2 TABLETS BY MOUTH EVERY 6 HOURS AS NEEDED FOR ANXIETY OR SLEEP 60 tablet 1     escitalopram oxalate (LEXAPRO) 20 MG tablet Take 1 tablet  (20 mg total) by mouth daily. 90 tablet 3     omeprazole (PRILOSEC) 20 MG capsule Take 1 capsule (20 mg total) by mouth daily before breakfast. 30 capsule 1     No current facility-administered medications for this visit.        Past Medical History, Family History, and Social History reviewed.  No past medical history on file.  No past surgical history on file.  Patient has no known allergies.  No family history on file.  Social History     Socioeconomic History     Marital status: Single     Spouse name: Not on file     Number of children: Not on file     Years of education: Not on file     Highest education level: Not on file   Occupational History     Not on file   Social Needs     Financial resource strain: Not on file     Food insecurity     Worry: Not on file     Inability: Not on file     Transportation needs     Medical: Not on file     Non-medical: Not on file   Tobacco Use     Smoking status: Never Smoker     Smokeless tobacco: Never Used     Tobacco comment: vapes   Substance and Sexual Activity     Alcohol use: No     Drug use: No     Sexual activity: Not Currently     Birth control/protection: I.U.D.   Lifestyle     Physical activity     Days per week: Not on file     Minutes per session: Not on file     Stress: Not on file   Relationships     Social connections     Talks on phone: Not on file     Gets together: Not on file     Attends [employer] service: Not on file     Active member of club or organization: Not on file     Attends meetings of clubs or organizations: Not on file     Relationship status: Not on file     Intimate partner violence     Fear of current or ex partner: Not on file     Emotionally abused: Not on file     Physically abused: Not on file     Forced sexual activity: Not on file   Other Topics Concern     Not on file   Social History Narrative     Not on file         Review of systems is as stated in HPI, and the remainder of the 10 system review is otherwise  negative.    Objective:     There were no vitals filed for this visit. There is no height or weight on file to calculate BMI.      Additional provider notes: GENERAL: Healthy, alert and no distress  EYES: Eyes grossly normal to inspection. No discharge or erythema, or obvious scleral/conjunctival abnormalities.  RESP: No audible wheeze, cough, or visible cyanosis.  No visible retractions or increased work of breathing.    NEURO: Cranial nerves grossly intact. Mentation and speech appropriate for age.  PSYCH: Mentation appears normal, affect normal/bright, judgement and insight intact, normal speech and appearance well-groomed      Video-Visit Details    Type of service:  Video Visit    Video End Time (time video stopped): 9:00  Originating Location (pt. Location): Home    Distant Location (provider location):  [facility] MEDICINE/OB     Platform used for Video Visit: Radhames Isaacs MD   [Patient Intake Form Reviewed] : Patient intake form was reviewed

## 2021-10-10 ENCOUNTER — HEALTH MAINTENANCE LETTER (OUTPATIENT)
Age: 21
End: 2021-10-10

## 2021-10-19 PROBLEM — F32.9 MAJOR DEPRESSION: Status: ACTIVE | Noted: 2020-09-14

## 2022-03-10 ENCOUNTER — NURSE TRIAGE (OUTPATIENT)
Dept: NURSING | Facility: CLINIC | Age: 22
End: 2022-03-10
Payer: COMMERCIAL

## 2022-03-11 NOTE — TELEPHONE ENCOUNTER
C/o painful dysuria starting last night. Pt says she did [facility] visit w/ [facility] and they prescribed abx but she has not been able to  Rx yet. Tonight dysuria continues and now also c/o back pain and lower abdominal pain, frequency and blood clots in urine. Triaged to see provider w/i 4 hr. Pt wants to go to ED now as she is in a lot of pain. She was crying during call due to pain. [facility] Will go to ED now.     Reason for Disposition    Side (flank) or lower back pain present    Additional Information    Negative: Shock suspected (e.g., cold/pale/clammy skin, too weak to stand, low BP, rapid pulse)    Negative: Sounds like a life-threatening emergency to the triager    Negative: Followed a genital area injury    Negative: Taking antibiotic for urinary tract infection (UTI)    Negative: Pregnant    Negative: Postpartum (from 0 to 6 weeks after delivery)    Negative: [1] Unable to urinate (or only a few drops) > 4 hours AND [2] bladder feels very full (e.g., palpable bladder or strong urge to urinate)    Negative: Vomiting    Negative: Patient sounds very sick or weak to the triager    Protocols used: URINATION PAIN - FEMALE-A-

## 2022-03-13 ENCOUNTER — HOSPITAL ENCOUNTER (EMERGENCY)
Facility: CLINIC | Age: 22
Discharge: HOME | End: 2022-03-13
Attending: EMERGENCY MEDICINE | Admitting: EMERGENCY MEDICINE
Payer: COMMERCIAL

## 2022-03-13 VITALS
OXYGEN SATURATION: 100 % | SYSTOLIC BLOOD PRESSURE: 111 MMHG | WEIGHT: 180 LBS | BODY MASS INDEX: 30.73 KG/M2 | HEIGHT: 64 IN | RESPIRATION RATE: 24 BRPM | DIASTOLIC BLOOD PRESSURE: 65 MMHG | TEMPERATURE: 97.2 F | HEART RATE: 55 BPM

## 2022-03-13 DIAGNOSIS — K29.00 OTHER ACUTE GASTRITIS WITHOUT HEMORRHAGE: ICD-10-CM

## 2022-03-13 LAB
ALBUMIN SERPL-MCNC: 4.9 G/DL (ref 3.5–5)
ALBUMIN UR-MCNC: 70 MG/DL
ALP SERPL-CCNC: 51 U/L (ref 45–120)
ALT SERPL W P-5'-P-CCNC: 31 U/L (ref 0–45)
ANION GAP SERPL CALCULATED.3IONS-SCNC: 18 MMOL/L (ref 5–18)
APPEARANCE UR: ABNORMAL
AST SERPL W P-5'-P-CCNC: 21 U/L (ref 0–40)
BACTERIA #/AREA URNS HPF: ABNORMAL /HPF
BASOPHILS # BLD AUTO: 0.1 10E3/UL (ref 0–0.2)
BASOPHILS NFR BLD AUTO: 0 %
BILIRUB SERPL-MCNC: 0.7 MG/DL (ref 0–1)
BILIRUB UR QL STRIP: NEGATIVE
BUN SERPL-MCNC: 10 MG/DL (ref 8–22)
CALCIUM SERPL-MCNC: 10 MG/DL (ref 8.5–10.5)
CHLORIDE BLD-SCNC: 108 MMOL/L (ref 98–107)
CO2 SERPL-SCNC: 15 MMOL/L (ref 22–31)
COLOR UR AUTO: YELLOW
CREAT SERPL-MCNC: 0.82 MG/DL (ref 0.6–1.1)
EOSINOPHIL # BLD AUTO: 0 10E3/UL (ref 0–0.7)
EOSINOPHIL NFR BLD AUTO: 0 %
ERYTHROCYTE [DISTWIDTH] IN BLOOD BY AUTOMATED COUNT: 12.6 % (ref 10–15)
GFR SERPL CREATININE-BSD FRML MDRD: >90 ML/MIN/1.73M2
GLUCOSE BLD-MCNC: 150 MG/DL (ref 70–125)
GLUCOSE BLDC GLUCOMTR-MCNC: 130 MG/DL (ref 70–99)
GLUCOSE UR STRIP-MCNC: NEGATIVE MG/DL
HCG UR QL: NEGATIVE
HCT VFR BLD AUTO: 42.2 % (ref 35–47)
HGB BLD-MCNC: 14.5 G/DL (ref 11.7–15.7)
HGB UR QL STRIP: NEGATIVE
HYALINE CASTS: 1 /LPF
IMM GRANULOCYTES # BLD: 0.1 10E3/UL
IMM GRANULOCYTES NFR BLD: 0 %
KETONES UR STRIP-MCNC: >150 MG/DL
LEUKOCYTE ESTERASE UR QL STRIP: NEGATIVE
LIPASE SERPL-CCNC: 20 U/L (ref 0–52)
LYMPHOCYTES # BLD AUTO: 2.1 10E3/UL (ref 0.8–5.3)
LYMPHOCYTES NFR BLD AUTO: 12 %
MCH RBC QN AUTO: 32 PG (ref 26.5–33)
MCHC RBC AUTO-ENTMCNC: 34.4 G/DL (ref 31.5–36.5)
MCV RBC AUTO: 93 FL (ref 78–100)
MONOCYTES # BLD AUTO: 0.5 10E3/UL (ref 0–1.3)
MONOCYTES NFR BLD AUTO: 3 %
MUCOUS THREADS #/AREA URNS LPF: PRESENT /LPF
NEUTROPHILS # BLD AUTO: 15.5 10E3/UL (ref 1.6–8.3)
NEUTROPHILS NFR BLD AUTO: 85 %
NITRATE UR QL: NEGATIVE
NRBC # BLD AUTO: 0 10E3/UL
NRBC BLD AUTO-RTO: 0 /100
PH UR STRIP: 7.5 [PH] (ref 5–7)
PLATELET # BLD AUTO: 366 10E3/UL (ref 150–450)
POTASSIUM BLD-SCNC: 3.5 MMOL/L (ref 3.5–5)
PROT SERPL-MCNC: 8 G/DL (ref 6–8)
RBC # BLD AUTO: 4.53 10E6/UL (ref 3.8–5.2)
RBC URINE: 3 /HPF
SODIUM SERPL-SCNC: 141 MMOL/L (ref 136–145)
SP GR UR STRIP: 1.03 (ref 1–1.03)
SQUAMOUS EPITHELIAL: 12 /HPF
UROBILINOGEN UR STRIP-MCNC: 2 MG/DL
WBC # BLD AUTO: 18.3 10E3/UL (ref 4–11)
WBC URINE: 7 /HPF

## 2022-03-13 PROCEDURE — 258N000003 HC RX IP 258 OP 636: Performed by: EMERGENCY MEDICINE

## 2022-03-13 PROCEDURE — 250N000011 HC RX IP 250 OP 636: Performed by: EMERGENCY MEDICINE

## 2022-03-13 PROCEDURE — 85025 COMPLETE CBC W/AUTO DIFF WBC: CPT | Performed by: EMERGENCY MEDICINE

## 2022-03-13 PROCEDURE — 99284 EMERGENCY DEPT VISIT MOD MDM: CPT | Mod: 25

## 2022-03-13 PROCEDURE — 96361 HYDRATE IV INFUSION ADD-ON: CPT

## 2022-03-13 PROCEDURE — 83690 ASSAY OF LIPASE: CPT | Performed by: EMERGENCY MEDICINE

## 2022-03-13 PROCEDURE — 81025 URINE PREGNANCY TEST: CPT | Performed by: EMERGENCY MEDICINE

## 2022-03-13 PROCEDURE — 36415 COLL VENOUS BLD VENIPUNCTURE: CPT | Performed by: EMERGENCY MEDICINE

## 2022-03-13 PROCEDURE — 81001 URINALYSIS AUTO W/SCOPE: CPT | Performed by: EMERGENCY MEDICINE

## 2022-03-13 PROCEDURE — 96375 TX/PRO/DX INJ NEW DRUG ADDON: CPT

## 2022-03-13 PROCEDURE — 80053 COMPREHEN METABOLIC PANEL: CPT | Performed by: EMERGENCY MEDICINE

## 2022-03-13 PROCEDURE — 96374 THER/PROPH/DIAG INJ IV PUSH: CPT

## 2022-03-13 PROCEDURE — 250N000009 HC RX 250: Performed by: EMERGENCY MEDICINE

## 2022-03-13 RX ORDER — DIPHENHYDRAMINE HYDROCHLORIDE 50 MG/ML
25 INJECTION INTRAMUSCULAR; INTRAVENOUS ONCE
Status: COMPLETED | OUTPATIENT
Start: 2022-03-13 | End: 2022-03-13

## 2022-03-13 RX ORDER — ONDANSETRON 2 MG/ML
8 INJECTION INTRAMUSCULAR; INTRAVENOUS ONCE
Status: COMPLETED | OUTPATIENT
Start: 2022-03-13 | End: 2022-03-13

## 2022-03-13 RX ORDER — KETOROLAC TROMETHAMINE 15 MG/ML
15 INJECTION, SOLUTION INTRAMUSCULAR; INTRAVENOUS ONCE
Status: COMPLETED | OUTPATIENT
Start: 2022-03-13 | End: 2022-03-13

## 2022-03-13 RX ORDER — ONDANSETRON 4 MG/1
4 TABLET, FILM COATED ORAL EVERY 6 HOURS PRN
Qty: 20 TABLET | Refills: 0 | Status: SHIPPED | OUTPATIENT
Start: 2022-03-13

## 2022-03-13 RX ORDER — SODIUM CHLORIDE, SODIUM LACTATE, POTASSIUM CHLORIDE, CALCIUM CHLORIDE 600; 310; 30; 20 MG/100ML; MG/100ML; MG/100ML; MG/100ML
INJECTION, SOLUTION INTRAVENOUS CONTINUOUS
Status: DISCONTINUED | OUTPATIENT
Start: 2022-03-13 | End: 2022-03-13

## 2022-03-13 RX ADMIN — FAMOTIDINE 20 MG: 10 INJECTION, SOLUTION INTRAVENOUS at 10:35

## 2022-03-13 RX ADMIN — DIPHENHYDRAMINE HYDROCHLORIDE 25 MG: 50 INJECTION INTRAMUSCULAR; INTRAVENOUS at 11:53

## 2022-03-13 RX ADMIN — SODIUM CHLORIDE, POTASSIUM CHLORIDE, SODIUM LACTATE AND CALCIUM CHLORIDE 1000 ML: 600; 310; 30; 20 INJECTION, SOLUTION INTRAVENOUS at 12:17

## 2022-03-13 RX ADMIN — PROMETHAZINE HYDROCHLORIDE 25 MG: 25 INJECTION INTRAMUSCULAR; INTRAVENOUS at 12:18

## 2022-03-13 RX ADMIN — KETOROLAC TROMETHAMINE 15 MG: 15 INJECTION, SOLUTION INTRAMUSCULAR; INTRAVENOUS at 10:35

## 2022-03-13 RX ADMIN — ONDANSETRON 8 MG: 2 INJECTION INTRAMUSCULAR; INTRAVENOUS at 10:34

## 2022-03-13 RX ADMIN — SODIUM CHLORIDE, POTASSIUM CHLORIDE, SODIUM LACTATE AND CALCIUM CHLORIDE 1000 ML: 600; 310; 30; 20 INJECTION, SOLUTION INTRAVENOUS at 10:36

## 2022-03-13 ASSESSMENT — ENCOUNTER SYMPTOMS
DYSURIA: 1
NAUSEA: 1
DIARRHEA: 1
VOMITING: 1
BACK PAIN: 0

## 2022-03-13 NOTE — ED NOTES
Patient reports feeling slightly better. No longer dry-heaving but reports she is still feeling nauseous.

## 2022-03-13 NOTE — ED PROVIDER NOTES
Emergency Department Encounter     Evaluation Date & Time:   3/13/2022  9:58 AM    CHIEF COMPLAINT:  Nausea & Vomiting      Triage Note:Pt comes in with nausea and vomiting that started this morning at 0500. She has been vomiting frequently stomach bile looking vomit. Did start antibiotics for a UTI on Friday.       FINAL IMPRESSION:    ICD-10-CM    1. Other acute gastritis without hemorrhage  K29.00     due to antibiotic       Impression and Plan     ED COURSE & MEDICAL DECISION MAKING:    10:10 AM Initial history and physical performed. Plan of care discussed. PPE utilized includes N95 mask, face shield, and gloves.   11:30 AM I rechecked and updated the patient.   ED Course as of 03/13/22 1433   Sun Mar 13, 2022   1018 Patient symptoms are likely secondary to her Macrobid causing gastritis.  However, since her original visit for lower abdominal pain and low back pain was just virtual and no diagnostic testing was done it is unclear if she truly had a UTI or if this may have been a different cause of abdominal pain that now has progressed to nausea vomiting.  I would say, however, that her lower back pain is gone which makes that somewhat less likely.  Based on her examination there is no CVA tenderness to percussion to suggest kidney stone but that is still in the differential since she described possibly some blood in her urine initially, and I do think pyelonephritis is still in the differential now with nausea and vomiting and a recent possible UTI, as well as something like appendicitis.  Less likely bowel obstruction as she has not had a previous surgeries and her abdomen is soft and nondistended.  Again, less likely this is gallbladder or liver as her initial symptoms were lower abdominal but we will do blood work since we are putting in the IV.  Her feeling of numbness in her arms and all of her sense of being unwell is likely due to her hyperventilation with frequent dry heaves and once we get that  improved she should slowly feel better though likely will be weak over the course of the day today.   1046 After the medication her dry heaving seems to have improved.   1130 Patient feels much better after the initial medications.  She is no longer having dry heaving.  She denies any abdominal pain, her abdomen is soft and nontender, but she is still a bit nauseous.  We will do additional nausea medication with promethazine and Benadryl but she has stopped dry heaving now for some time.  Blood work and urine both appears significantly hemoconcentrated and urine otherwise is negative for leukocyte esterase or nitrites so is the most likely cause of her symptoms today is antibiotic induced gastritis we will have her stop the antibiotics and simply follow-up with her primary care physician in a few days for recheck if she is still feeling rundown, or redevelops urinary symptoms (currently has none)   1216 Checked with pharmacy on delay with promethazine.  Still no n/v.  Fluids ongoing.   family asking about repeating ua, but patient declines at this time.  I did recommend that it may be worth it to have her get her urine rechecked at the clinic in a couple of days once she has a chance to rehydrate herself.    At the conclusion of the encounter I discussed the results of all the tests and the disposition. The questions were answered. The patient or family acknowledged understanding and was agreeable with the care plan.          0 minutes of critical care time        MEDICATIONS GIVEN IN THE EMERGENCY DEPARTMENT:  Medications   promethazine (PHENERGAN) 25 mg in sodium chloride 0.9 % 50 mL intermittent infusion (25 mg Intravenous Given 3/13/22 1218)   ondansetron (ZOFRAN) injection 8 mg (8 mg Intravenous Given 3/13/22 1034)   famotidine (PEPCID) injection 20 mg (20 mg Intravenous Given 3/13/22 1035)   lactated ringers BOLUS 1,000 mL (0 mLs Intravenous Stopped 3/13/22 1217)     Followed by   lactated ringers BOLUS 1,000  mL (0 mLs Intravenous Stopped 3/13/22 1320)   ketorolac (TORADOL) injection 15 mg (15 mg Intravenous Given 3/13/22 1035)   diphenhydrAMINE (BENADRYL) injection 25 mg (25 mg Intravenous Given 3/13/22 1153)       NEW PRESCRIPTIONS STARTED AT TODAY'S ED VISIT:  Discharge Medication List as of 3/13/2022  1:20 PM      START taking these medications    Details   ondansetron (ZOFRAN) 4 MG tablet Take 1 tablet (4 mg) by mouth every 6 hours as needed for nausea or vomiting, Disp-20 tablet, R-0, E-Prescribe             HPI     HPI     Paulette Harvey is a 21 year old female with a pertinent history of anxiety and depression who presents to this ED by walk in for evaluation of nausea and vomiting.     Patient complains of constant nausea and vomiting that began at 5 AM this morning. Patient reports that she has been taking Macrobid since 3/11/22 for treatment of a UTI. Patient reports that she had dysuria and low back pain associated with her UTI. Patient denies current back pain but notes that her dysuria had gotten better but has since gotten worse again. Patient endorses her last menstrual cycle being at the end of February. taking anxiety and depression medications in the past, and having episodes of diarrhea. Patient denies any known allergies or any other concerns at this time.     REVIEW OF SYSTEMS:  Review of Systems   Gastrointestinal: Positive for diarrhea, nausea and vomiting.   Genitourinary: Positive for dysuria.   Musculoskeletal: Negative for back pain.   All other systems reviewed and are negative.    remainder of systems are all otherwise negative.        Medical History     No past medical history on file.    No past surgical history on file.    Family History   Problem Relation Age of Onset     No Known Problems Mother      No Known Problems Father      Diabetes Maternal Grandmother      Tremor Maternal Grandfather      No Known Problems Paternal Grandmother      No Known Problems Paternal Grandfather   "      Social History     Tobacco Use     Smoking status: Never Smoker     Smokeless tobacco: Never Used     Tobacco comment: vapes nicotine   Substance Use Topics     Alcohol use: Yes     Drug use: Yes     Types: Marijuana       ondansetron (ZOFRAN) 4 MG tablet  etonogestreL (NEXPLANON) 68 mg Impl implant        Physical Exam     First Vitals:  Patient Vitals for the past 24 hrs:   BP Temp Temp src Pulse Resp SpO2 Height Weight   03/13/22 1330 111/65 -- -- -- -- 100 % -- --   03/13/22 1230 -- -- -- 55 -- 100 % -- --   03/13/22 1215 -- -- -- 52 -- 100 % -- --   03/13/22 1200 -- -- -- (!) 49 -- 100 % -- --   03/13/22 1145 -- -- -- (!) 49 -- 99 % -- --   03/13/22 1130 111/59 -- -- 61 -- 100 % -- --   03/13/22 1128 112/56 -- -- 62 -- 100 % -- --   03/13/22 1002 124/79 97.2  F (36.2  C) Oral 77 24 96 % 1.626 m (5' 4\") 81.6 kg (180 lb)       PHYSICAL EXAM:   Constitutional:   Patient is sitting semi upright in bed. Patient is frequently gagging and dry heaving. Patient appears uncomfortable.  HENT:  Normocephalic. Patient is wearing a mask.  Eyes:  PERRL, EOMI, Conjunctiva normal, No discharge, no scleral icterus.  Respiratory:  Breathing heavily, lungs clear to auscultation.  Cardiovascular:  RRR nl s1s2 0 murmurs, rubs, or gallops.  Peripheral pulses dp, pt, and radial are wnl.  No peripheral edema   GI:  Bowel sounds normal, Soft, No tenderness, No flank tenderness, nondistended.  :No CVA tenderness.   Musculoskeletal:  Moves all extremities.  No erythematous or swollen major joints,   Integument:  Skin color is normal  Lymphatic:  No cervical lymphadenopathy  Neurologic:  Alert & oriented x 3, Normal motor function, Normal sensory function, No focal deficits noted. Normal speech.  Psychiatric:  Affect normal, Judgment normal, Mood normal.     Results     LAB AND RADIOLOGY:  All pertinent labs reviewed and interpreted  Results for orders placed or performed during the hospital encounter of 03/13/22   Comprehensive " metabolic panel     Status: Abnormal   Result Value Ref Range    Sodium 141 136 - 145 mmol/L    Potassium 3.5 3.5 - 5.0 mmol/L    Chloride 108 (H) 98 - 107 mmol/L    Carbon Dioxide (CO2) 15 (L) 22 - 31 mmol/L    Anion Gap 18 5 - 18 mmol/L    Urea Nitrogen 10 8 - 22 mg/dL    Creatinine 0.82 0.60 - 1.10 mg/dL    Calcium 10.0 8.5 - 10.5 mg/dL    Glucose 150 (H) 70 - 125 mg/dL    Alkaline Phosphatase 51 45 - 120 U/L    AST 21 0 - 40 U/L    ALT 31 0 - 45 U/L    Protein Total 8.0 6.0 - 8.0 g/dL    Albumin 4.9 3.5 - 5.0 g/dL    Bilirubin Total 0.7 0.0 - 1.0 mg/dL    GFR Estimate >90 >60 mL/min/1.73m2   Lipase     Status: Normal   Result Value Ref Range    Lipase 20 0 - 52 U/L   HCG qualitative urine     Status: Normal   Result Value Ref Range    hCG Urine Qualitative Negative Negative   UA with Microscopic reflex to Culture     Status: Abnormal    Specimen: Urine, Midstream   Result Value Ref Range    Color Urine Yellow Colorless, Straw, Light Yellow, Yellow    Appearance Urine Turbid (A) Clear    Glucose Urine Negative Negative mg/dL    Bilirubin Urine Negative Negative    Ketones Urine >150 (A) Negative mg/dL    Specific Gravity Urine 1.033 (H) 1.001 - 1.030    Blood Urine Negative Negative    pH Urine 7.5 (H) 5.0 - 7.0    Protein Albumin Urine 70  (A) Negative mg/dL    Urobilinogen Urine 2.0 (A) <2.0 mg/dL    Nitrite Urine Negative Negative    Leukocyte Esterase Urine Negative Negative    Bacteria Urine Few (A) None Seen /HPF    Mucus Urine Present (A) None Seen /LPF    RBC Urine 3 (H) <=2 /HPF    WBC Urine 7 (H) <=5 /HPF    Squamous Epithelials Urine 12 (H) <=1 /HPF    Hyaline Casts Urine 1 <=2 /LPF    Narrative    Urine Culture not indicated   Glucose by meter     Status: Abnormal   Result Value Ref Range    GLUCOSE BY METER POCT 130 (H) 70 - 99 mg/dL   CBC with platelets and differential     Status: Abnormal   Result Value Ref Range    WBC Count 18.3 (H) 4.0 - 11.0 10e3/uL    RBC Count 4.53 3.80 - 5.20 10e6/uL     Hemoglobin 14.5 11.7 - 15.7 g/dL    Hematocrit 42.2 35.0 - 47.0 %    MCV 93 78 - 100 fL    MCH 32.0 26.5 - 33.0 pg    MCHC 34.4 31.5 - 36.5 g/dL    RDW 12.6 10.0 - 15.0 %    Platelet Count 366 150 - 450 10e3/uL    % Neutrophils 85 %    % Lymphocytes 12 %    % Monocytes 3 %    % Eosinophils 0 %    % Basophils 0 %    % Immature Granulocytes 0 %    NRBCs per 100 WBC 0 <1 /100    Absolute Neutrophils 15.5 (H) 1.6 - 8.3 10e3/uL    Absolute Lymphocytes 2.1 0.8 - 5.3 10e3/uL    Absolute Monocytes 0.5 0.0 - 1.3 10e3/uL    Absolute Eosinophils 0.0 0.0 - 0.7 10e3/uL    Absolute Basophils 0.1 0.0 - 0.2 10e3/uL    Absolute Immature Granulocytes 0.1 <=0.4 10e3/uL    Absolute NRBCs 0.0 10e3/uL   CBC with platelets differential     Status: Abnormal    Narrative    The following orders were created for panel order CBC with platelets differential.  Procedure                               Abnormality         Status                     ---------                               -----------         ------                     CBC with platelets and d...[[id number]]  Abnormal            Final result                 Please view results for these tests on the individual orders.              The creation of this record is based on the scribe s observations of the work being performed by Levi Barnard and the provider s statements to them. This document has been checked and approved by MD Lyla Heller MD  Emergency Medicine  [facility] EMERGENCY ROOM        Lyla Robertson MD  03/13/22 9538

## 2022-03-13 NOTE — ED TRIAGE NOTES
Pt comes in with nausea and vomiting that started this morning at 0500. She has been vomiting frequently stomach bile looking vomit. Did start antibiotics for a UTI on Friday.

## 2022-03-13 NOTE — Clinical Note
Paulette Harvey was seen and treated in our emergency department on 3/13/2022.         Sincerely,     [facility]

## 2022-03-13 NOTE — DISCHARGE INSTRUCTIONS
Stop taking the antibiotic.    I would recommend taking Pepcid or any of the over-the-counter antacids twice a day for the next couple of days as your stomach continues to settle.    Avoid anything spicy, alcohol, or marijuana.    Call your clinic to schedule a recheck of your urine once you are rehydrated in a couple of days to see if the blood/protein has cleared up.    Use the ondansetron as needed for ongoing nausea at home.    It is possible you may develop a little bit of diarrhea after being on the antibiotics but that should improve after a few days.    Follow-up with your primary care physician in 2 to 3 days for recheck if you are still feeling nauseous, but return to the emergency department over the weekend if you continue to have vomiting, develop fevers or more urinary symptoms for recheck.

## 2022-03-13 NOTE — ED NOTES
Introduced self to patient. White board updated. Expected length of stay explained to patient. Patient has call light within reach. Side rails up. Pain assessed. Patient endorses daily marijuana use.

## 2022-04-05 NOTE — PROGRESS NOTES
Assessment/Plan:    Abnormal urinalysis  Will recheck UA today given previously abnormal, asymptomatic at this time.  - UA macro with reflex to Microscopic and Culture - Clinc Collect    Anxiety  Panic attack  Uncontrolled anxiety with panic attacks causing nausea/vomiting regularly - will start selective serotonin reuptake inhibitor as below and gabapentin as needed for panic attacks, pt will work to re-establish with therapist. Follow-up 4 weeks.  - sertraline (ZOLOFT) 25 MG tablet  Dispense: 90 tablet; Refill: 1  - gabapentin (NEURONTIN) 100 MG capsule  Dispense: 90 capsule; Refill: 1    Non-intractable vomiting with nausea, unspecified vomiting type  zofran given as needed for severe nausea from anxiety.  - ondansetron (ZOFRAN-ODT) 4 MG ODT tab  Dispense: 30 tablet; Refill: 1       Follow up: 4 weeks, ok for virtual    Rebecca Cardenas MD  [facility]    Subjective:   Paulette Harvey is a 21 year old female is here today for follow-up ER, anxiety    -3/13 ER visit for nausea/vomiting - diagnosed with gastritis 2/2 macrobid (taking for UTI) - repeat UA was very abnormal but thought to be related to dehydration - recommended repeat outpt  -has been feeling overall ok since leaving the ER - urinary issues gone  -feels like anxiety is bad which makes her feel nauseous - low appetite/doesn't want to eat (most days) - sometimes will vomit from bad anxiety (probably twice/week)  -previously tried therapy (working on restarting this), medications: hydroxyzine (made her really drowsy)      Answers for HPI/ROS submitted by the patient on 4/6/2022  If you checked off any problems, how difficult have these problems made it for you to do your work, take care of things at home, or get along with other people?: Somewhat difficult  PHQ9 TOTAL SCORE: 18  ESTELA 7 TOTAL SCORE: 21      Patient Active Problem List   Diagnosis     Moderate major depression (H)     Anxiety     Nexplanon in place     History reviewed. No  pertinent past medical history.  History reviewed. No pertinent surgical history.  Current Outpatient Medications   Medication     etonogestreL (NEXPLANON) 68 mg Impl implant     gabapentin (NEURONTIN) 100 MG capsule     ondansetron (ZOFRAN) 4 MG tablet     ondansetron (ZOFRAN-ODT) 4 MG ODT tab     sertraline (ZOLOFT) 25 MG tablet     No current facility-administered medications for this visit.     Allergies   Allergen Reactions     Macrobid [Nitrofurantoin] Nausea and Vomiting     Antibiotic gastritis with recurrent vomiting.     Social History     Socioeconomic History     Marital status: Single     Spouse name: Not on file     Number of children: Not on file     Years of education: Not on file     Highest education level: Not on file   Occupational History     Not on file   Tobacco Use     Smoking status: Never Smoker     Smokeless tobacco: Never Used     Tobacco comment: vapes nicotine   Substance and Sexual Activity     Alcohol use: Yes     Drug use: Yes     Types: Marijuana     Sexual activity: Not Currently     Birth control/protection: I.U.D.   Other Topics Concern     Not on file   Social History Narrative     Not on file     Social Determinants of Health     Financial Resource Strain: Not on file   Food Insecurity: Not on file   Transportation Needs: Not on file   Physical Activity: Not on file   Stress: Not on file   Social Connections: Not on file   Intimate Partner Violence: Not on file   Housing Stability: Not on file     Family History   Problem Relation Age of Onset     No Known Problems Mother      No Known Problems Father      Diabetes Maternal Grandmother      Tremor Maternal Grandfather      No Known Problems Paternal Grandmother      No Known Problems Paternal Grandfather      Review of systems is as stated in HPI, and the remainder of system review is otherwise negative.    Objective:     /60 (BP Location: Left arm, Patient Position: Sitting, Cuff Size: Adult Regular)   Wt 76.2 kg (168  lb)   BMI 28.84 kg/m      General appearance: awake, NAD  HEENT: atraumatic, normocephalic, no scleral icterus or injection  Lungs: breathing comfortably on room air  Extremities: moving all extremities  Neuro: alert, oriented x3, CNs grossly intact, no focal deficits appreciated  Psych: normal mood/affect/behavior, answering questions appropriately, linear thought process

## 2022-04-06 ENCOUNTER — OFFICE VISIT (OUTPATIENT)
Dept: FAMILY MEDICINE | Facility: CLINIC | Age: 22
End: 2022-04-06
Payer: COMMERCIAL

## 2022-04-06 VITALS — WEIGHT: 168 LBS | BODY MASS INDEX: 28.84 KG/M2 | DIASTOLIC BLOOD PRESSURE: 60 MMHG | SYSTOLIC BLOOD PRESSURE: 110 MMHG

## 2022-04-06 DIAGNOSIS — F41.9 ANXIETY: ICD-10-CM

## 2022-04-06 DIAGNOSIS — F41.0 PANIC ATTACK: ICD-10-CM

## 2022-04-06 DIAGNOSIS — R11.2 NON-INTRACTABLE VOMITING WITH NAUSEA, UNSPECIFIED VOMITING TYPE: ICD-10-CM

## 2022-04-06 DIAGNOSIS — R82.90 ABNORMAL URINALYSIS: Primary | ICD-10-CM

## 2022-04-06 LAB
ALBUMIN UR-MCNC: NEGATIVE MG/DL
APPEARANCE UR: CLEAR
BILIRUB UR QL STRIP: NEGATIVE
COLOR UR AUTO: YELLOW
GLUCOSE UR STRIP-MCNC: NEGATIVE MG/DL
HGB UR QL STRIP: NEGATIVE
KETONES UR STRIP-MCNC: NEGATIVE MG/DL
LEUKOCYTE ESTERASE UR QL STRIP: NEGATIVE
NITRATE UR QL: NEGATIVE
PH UR STRIP: 7 [PH] (ref 5–8)
SP GR UR STRIP: 1.02 (ref 1–1.03)
UROBILINOGEN UR STRIP-ACNC: 0.2 E.U./DL

## 2022-04-06 PROCEDURE — 81003 URINALYSIS AUTO W/O SCOPE: CPT | Performed by: FAMILY MEDICINE

## 2022-04-06 PROCEDURE — 99214 OFFICE O/P EST MOD 30 MIN: CPT | Performed by: FAMILY MEDICINE

## 2022-04-06 RX ORDER — GABAPENTIN 100 MG/1
100 CAPSULE ORAL 3 TIMES DAILY
Qty: 90 CAPSULE | Refills: 1 | Status: SHIPPED | OUTPATIENT
Start: 2022-04-06 | End: 2024-08-08

## 2022-04-06 RX ORDER — SERTRALINE HYDROCHLORIDE 25 MG/1
25 TABLET, FILM COATED ORAL DAILY
Qty: 90 TABLET | Refills: 1 | Status: SHIPPED | OUTPATIENT
Start: 2022-04-06 | End: 2022-05-04

## 2022-04-06 RX ORDER — ONDANSETRON 4 MG/1
4 TABLET, ORALLY DISINTEGRATING ORAL EVERY 12 HOURS PRN
Qty: 30 TABLET | Refills: 1 | Status: SHIPPED | OUTPATIENT
Start: 2022-04-06 | End: 2022-05-04

## 2022-04-06 ASSESSMENT — PATIENT HEALTH QUESTIONNAIRE - PHQ9
10. IF YOU CHECKED OFF ANY PROBLEMS, HOW DIFFICULT HAVE THESE PROBLEMS MADE IT FOR YOU TO DO YOUR WORK, TAKE CARE OF THINGS AT HOME, OR GET ALONG WITH OTHER PEOPLE: SOMEWHAT DIFFICULT
SUM OF ALL RESPONSES TO PHQ QUESTIONS 1-9: 18
SUM OF ALL RESPONSES TO PHQ QUESTIONS 1-9: 18

## 2022-04-06 ASSESSMENT — ANXIETY QUESTIONNAIRES
3. WORRYING TOO MUCH ABOUT DIFFERENT THINGS: NEARLY EVERY DAY
2. NOT BEING ABLE TO STOP OR CONTROL WORRYING: NEARLY EVERY DAY
6. BECOMING EASILY ANNOYED OR IRRITABLE: NEARLY EVERY DAY
5. BEING SO RESTLESS THAT IT IS HARD TO SIT STILL: NEARLY EVERY DAY
7. FEELING AFRAID AS IF SOMETHING AWFUL MIGHT HAPPEN: NEARLY EVERY DAY
GAD7 TOTAL SCORE: 21
1. FEELING NERVOUS, ANXIOUS, OR ON EDGE: NEARLY EVERY DAY
GAD7 TOTAL SCORE: 21
GAD7 TOTAL SCORE: 21
4. TROUBLE RELAXING: NEARLY EVERY DAY
7. FEELING AFRAID AS IF SOMETHING AWFUL MIGHT HAPPEN: NEARLY EVERY DAY

## 2022-04-06 NOTE — PATIENT INSTRUCTIONS
Start zoloft (sertraline) 25 mg daily  -can increase to two tablets (50mg) daily after 2-4 weeks  -call/mychart with any concerns or questions    Start gabapentin as needed for panic attack - 100mg three times daily as needed  -this dose can be increased if needed to manage panic attacks if it isn't causing you to be too tired

## 2022-04-07 ASSESSMENT — PATIENT HEALTH QUESTIONNAIRE - PHQ9: SUM OF ALL RESPONSES TO PHQ QUESTIONS 1-9: 18

## 2022-04-07 ASSESSMENT — ANXIETY QUESTIONNAIRES: GAD7 TOTAL SCORE: 21

## 2022-04-29 NOTE — PROGRESS NOTES
Assessment/Plan:    Anxiety  Panic attack  Mild improvement on zoloft 25mg daily but overall symptoms still uncontrolled - will increase zoloft to 50mg daily; pt will continue gabapentin, discussed could do 100-200mg as needed for anxiety attack. Follow-up in 1-3 months depending on symptom control, ok for virtual, sooner as needed.   - sertraline (ZOLOFT) 25 MG tablet  Dispense: 90 tablet; Refill: 1       Follow up: 1-3 months depending on symptoms    Rebecca Cardenas MD  [facility]    Subjective:   Paulette Harvey is a 21 year old female is here today for mood/med ck    -no significant change but less panic attacks - mild improvement in symptoms  -no side effects  -gabapentin doesn't make her drowsy - is helpful - uses most days of the week but not all  -hasn't yet re-established with therapy d/t recent work promotion/schedule change      Answers for HPI/ROS submitted by the patient on 5/3/2022  If you checked off any problems, how difficult have these problems made it for you to do your work, take care of things at home, or get along with other people?: Somewhat difficult  PHQ9 TOTAL SCORE: 11  ESTELA 7 TOTAL SCORE: 19  Depression/Anxiety: Depression & Anxiety  Status since last visit:: no change  Anxiety since last: : no change  Other associated symptoms of depression:: Yes  Other associated symotome: : Yes  Significant life event: : relationship concerns, job concerns  Anxious:: Yes  Current substance use:: No  How many servings of fruits and vegetables do you eat daily?: 0-1  On average, how many sweetened beverages do you drink each day (Examples: soda, juice, sweet tea, etc.  Do NOT count diet or artificially sweetened beverages)?: 4  How many minutes a day do you exercise enough to make your heart beat faster?: 30 to 60  How many days a week do you exercise enough to make your heart beat faster?: 4  How many days per week do you miss taking your medication?: 2  What makes it hard for you to take  your medication every day?: remembering to take    Patient Active Problem List   Diagnosis     Moderate major depression (H)     Anxiety     Nexplanon in place     History reviewed. No pertinent past medical history.  History reviewed. No pertinent surgical history.  Current Outpatient Medications   Medication     etonogestreL (NEXPLANON) 68 mg Impl implant     gabapentin (NEURONTIN) 100 MG capsule     ondansetron (ZOFRAN) 4 MG tablet     sertraline (ZOLOFT) 25 MG tablet     No current facility-administered medications for this visit.     Allergies   Allergen Reactions     Macrobid [Nitrofurantoin] Nausea and Vomiting     Antibiotic gastritis with recurrent vomiting.     Social History     Socioeconomic History     Marital status: Single     Spouse name: Not on file     Number of children: Not on file     Years of education: Not on file     Highest education level: Not on file   Occupational History     Not on file   Tobacco Use     Smoking status: Never Smoker     Smokeless tobacco: Never Used     Tobacco comment: vapes nicotine   Substance and Sexual Activity     Alcohol use: Yes     Drug use: Yes     Types: Marijuana     Sexual activity: Not Currently     Birth control/protection: I.U.D.   Other Topics Concern     Not on file   Social History Narrative     Not on file     Social Determinants of Health     Financial Resource Strain: Not on file   Food Insecurity: Not on file   Transportation Needs: Not on file   Physical Activity: Not on file   Stress: Not on file   Social Connections: Not on file   Intimate Partner Violence: Not on file   Housing Stability: Not on file     Family History   Problem Relation Age of Onset     No Known Problems Mother      No Known Problems Father      Diabetes Maternal Grandmother      Tremor Maternal Grandfather      No Known Problems Paternal Grandmother      No Known Problems Paternal Grandfather      Review of systems is as stated in HPI, and the remainder of system review is  otherwise negative.    Objective:     /86   Pulse 101   Wt 76.7 kg (169 lb)   SpO2 99%   BMI 29.01 kg/m      General appearance: awake, NAD  HEENT: atraumatic, normocephalic, no scleral icterus or injection  Lungs: breathing comfortably on room air  Neuro: alert, oriented x3, CNs grossly intact, no focal deficits appreciated  Psych: normal mood/affect/behavior, answering questions appropriately, linear thought process

## 2022-05-03 ASSESSMENT — ANXIETY QUESTIONNAIRES
GAD7 TOTAL SCORE: 19
7. FEELING AFRAID AS IF SOMETHING AWFUL MIGHT HAPPEN: SEVERAL DAYS
GAD7 TOTAL SCORE: 19
5. BEING SO RESTLESS THAT IT IS HARD TO SIT STILL: NEARLY EVERY DAY
1. FEELING NERVOUS, ANXIOUS, OR ON EDGE: NEARLY EVERY DAY
6. BECOMING EASILY ANNOYED OR IRRITABLE: NEARLY EVERY DAY
3. WORRYING TOO MUCH ABOUT DIFFERENT THINGS: NEARLY EVERY DAY
7. FEELING AFRAID AS IF SOMETHING AWFUL MIGHT HAPPEN: SEVERAL DAYS
4. TROUBLE RELAXING: NEARLY EVERY DAY
2. NOT BEING ABLE TO STOP OR CONTROL WORRYING: NEARLY EVERY DAY
GAD7 TOTAL SCORE: 19

## 2022-05-03 ASSESSMENT — PATIENT HEALTH QUESTIONNAIRE - PHQ9
10. IF YOU CHECKED OFF ANY PROBLEMS, HOW DIFFICULT HAVE THESE PROBLEMS MADE IT FOR YOU TO DO YOUR WORK, TAKE CARE OF THINGS AT HOME, OR GET ALONG WITH OTHER PEOPLE: SOMEWHAT DIFFICULT
SUM OF ALL RESPONSES TO PHQ QUESTIONS 1-9: 11
SUM OF ALL RESPONSES TO PHQ QUESTIONS 1-9: 11

## 2022-05-04 ENCOUNTER — OFFICE VISIT (OUTPATIENT)
Dept: FAMILY MEDICINE | Facility: CLINIC | Age: 22
End: 2022-05-04
Payer: COMMERCIAL

## 2022-05-04 VITALS
OXYGEN SATURATION: 99 % | HEART RATE: 101 BPM | WEIGHT: 169 LBS | DIASTOLIC BLOOD PRESSURE: 86 MMHG | BODY MASS INDEX: 29.01 KG/M2 | SYSTOLIC BLOOD PRESSURE: 124 MMHG

## 2022-05-04 DIAGNOSIS — F41.9 ANXIETY: Primary | ICD-10-CM

## 2022-05-04 DIAGNOSIS — F41.0 PANIC ATTACK: ICD-10-CM

## 2022-05-04 PROCEDURE — 99213 OFFICE O/P EST LOW 20 MIN: CPT | Performed by: FAMILY MEDICINE

## 2022-05-04 RX ORDER — SERTRALINE HYDROCHLORIDE 25 MG/1
50 TABLET, FILM COATED ORAL DAILY
Qty: 90 TABLET | Refills: 1
Start: 2022-05-04 | End: 2022-06-15

## 2022-05-04 ASSESSMENT — ANXIETY QUESTIONNAIRES: GAD7 TOTAL SCORE: 19

## 2022-05-04 ASSESSMENT — PATIENT HEALTH QUESTIONNAIRE - PHQ9: SUM OF ALL RESPONSES TO PHQ QUESTIONS 1-9: 11

## 2022-05-04 NOTE — PATIENT INSTRUCTIONS
Increase zoloft to 50 mg daily (two 25mg tablets)  Continue gabapentin as needed - can do 100-200mg per anxiety episode    Follow-up in 1-3 months depending on how you are doing (sooner if needing dose change or other concerns)

## 2022-06-15 ENCOUNTER — VIRTUAL VISIT (OUTPATIENT)
Dept: FAMILY MEDICINE | Facility: CLINIC | Age: 22
End: 2022-06-15
Payer: COMMERCIAL

## 2022-06-15 DIAGNOSIS — F41.9 ANXIETY: Primary | ICD-10-CM

## 2022-06-15 DIAGNOSIS — F33.0 MILD EPISODE OF RECURRENT MAJOR DEPRESSIVE DISORDER (H): ICD-10-CM

## 2022-06-15 DIAGNOSIS — F41.0 PANIC ATTACK: ICD-10-CM

## 2022-06-15 PROCEDURE — 99214 OFFICE O/P EST MOD 30 MIN: CPT | Mod: TEL | Performed by: FAMILY MEDICINE

## 2022-06-15 PROCEDURE — 96127 BRIEF EMOTIONAL/BEHAV ASSMT: CPT | Performed by: FAMILY MEDICINE

## 2022-06-15 RX ORDER — SERTRALINE HYDROCHLORIDE 100 MG/1
100 TABLET, FILM COATED ORAL DAILY
Qty: 30 TABLET | Refills: 1 | Status: SHIPPED | OUTPATIENT
Start: 2022-06-15 | End: 2022-07-09

## 2022-06-15 ASSESSMENT — ANXIETY QUESTIONNAIRES
7. FEELING AFRAID AS IF SOMETHING AWFUL MIGHT HAPPEN: MORE THAN HALF THE DAYS
2. NOT BEING ABLE TO STOP OR CONTROL WORRYING: MORE THAN HALF THE DAYS
GAD7 TOTAL SCORE: 14
GAD7 TOTAL SCORE: 14
6. BECOMING EASILY ANNOYED OR IRRITABLE: MORE THAN HALF THE DAYS
7. FEELING AFRAID AS IF SOMETHING AWFUL MIGHT HAPPEN: MORE THAN HALF THE DAYS
4. TROUBLE RELAXING: MORE THAN HALF THE DAYS
1. FEELING NERVOUS, ANXIOUS, OR ON EDGE: MORE THAN HALF THE DAYS
8. IF YOU CHECKED OFF ANY PROBLEMS, HOW DIFFICULT HAVE THESE MADE IT FOR YOU TO DO YOUR WORK, TAKE CARE OF THINGS AT HOME, OR GET ALONG WITH OTHER PEOPLE?: SOMEWHAT DIFFICULT
3. WORRYING TOO MUCH ABOUT DIFFERENT THINGS: MORE THAN HALF THE DAYS
GAD7 TOTAL SCORE: 14
5. BEING SO RESTLESS THAT IT IS HARD TO SIT STILL: MORE THAN HALF THE DAYS

## 2022-06-15 NOTE — PROGRESS NOTES
Paulette is a 21 year old who is being evaluated via a billable telephone visit.      What phone number would you like to be contacted at? [phone]   How would you like to obtain your AVS? Eric    Assessment/Plan:    Anxiety  Panic attack  Mild episode of recurrent major depressive disorder (H)  Uncontrolled symptoms at this time.  PHQ-9 and ESTELA-7 scores as below.  We will increase Zoloft to 100 mg daily.  She will continue using gabapentin as needed for anxiety/panic.  I encouraged her to set up a therapy appointment to help with management of symptoms.  Follow-up in 4 weeks for recheck, sooner as needed, okay for virtual.  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 which is getting a little bit better but has caused more anxiety overall), 3 weeks ago her stepdad had an accident was in a medically induced coma (now home and doing well), currently has COVID (main symptoms are congestion, feeling feverish, 1 episode of vomiting, symptoms started last Friday and patient tested positive on Sunday, overall improving and will go back to work tomorrow)  -She is currently taking Zoloft 50 mg daily  -She reports no side effects but has not really noticed any benefit  -She reports using gabapentin 100 mg as needed, she does notice just a little bit of fatigue from this but overall tolerable, she notices if she is mild to medium anxious then it is helpful but if she is super worked up then it does not help very much  -She reports she is looking for therapy but currently is not seeing anyone (she reports having an old therapist that she  plans to reconnect with)      Answers for HPI/ROS submitted by the patient on 6/15/2022  If you checked off any problems, how difficult have these problems made it for you to do your work, take care of things at home, or get along with other people?: Somewhat difficult  PHQ9 TOTAL SCORE: 11  ESTELA 7 TOTAL SCORE: 14  Depression/Anxiety: Depression & Anxiety  Status since last visit:: no change  Anxiety since last: : no change  Other associated symptoms of depression:: No  Other associated symotome: : No  Significant life event: : relationship concerns  Anxious:: Yes  Current substance use:: No  How many servings of fruits and vegetables do you eat daily?: 0-1  On average, how many sweetened beverages do you drink each day (Examples: soda, juice, sweet tea, etc.  Do NOT count diet or artificially sweetened beverages)?: 3  How many minutes a day do you exercise enough to make your heart beat faster?: 20 to 29  How many days a week do you exercise enough to make your heart beat faster?: 3 or less  How many days per week do you miss taking your medication?: 2  What makes it hard for you to take your medication every day?: remembering to take      Exam:  General: Answering questions appropriately, linear thought process, does not sound short of breath, no cough heard    Patient Active Problem List   Diagnosis     Moderate major depression (H)     Anxiety     Nexplanon in place     History reviewed. No pertinent past medical history.  History reviewed. No pertinent surgical history.  Current Outpatient Medications   Medication     etonogestreL (NEXPLANON) 68 mg Impl implant     gabapentin (NEURONTIN) 100 MG capsule     ondansetron (ZOFRAN) 4 MG tablet     sertraline (ZOLOFT) 100 MG tablet     No current facility-administered medications for this visit.     Allergies   Allergen Reactions     Macrobid [Nitrofurantoin] Nausea and Vomiting     Antibiotic gastritis with recurrent vomiting.     Social History     Socioeconomic  History     Marital status: Single     Spouse name: Not on file     Number of children: Not on file     Years of education: Not on file     Highest education level: Not on file   Occupational History     Not on file   Tobacco Use     Smoking status: Never Smoker     Smokeless tobacco: Never Used     Tobacco comment: vapes nicotine   Substance and Sexual Activity     Alcohol use: Yes     Drug use: Yes     Types: Marijuana     Sexual activity: Not Currently     Birth control/protection: I.U.D.   Other Topics Concern     Not on file   Social History Narrative     Not on file     Social Determinants of Health     Financial Resource Strain: Not on file   Food Insecurity: Not on file   Transportation Needs: Not on file   Physical Activity: Not on file   Stress: Not on file   Social Connections: Not on file   Intimate Partner Violence: Not on file   Housing Stability: Not on file     Family History   Problem Relation Age of Onset     No Known Problems Mother      No Known Problems Father      Diabetes Maternal Grandmother      Tremor Maternal Grandfather      No Known Problems Paternal Grandmother      No Known Problems Paternal Grandfather      Review of systems is as stated in HPI, and the remainder of system review is otherwise negative.

## 2022-07-08 DIAGNOSIS — F41.9 ANXIETY: ICD-10-CM

## 2022-07-08 DIAGNOSIS — F41.0 PANIC ATTACK: ICD-10-CM

## 2022-07-09 RX ORDER — SERTRALINE HYDROCHLORIDE 100 MG/1
100 TABLET, FILM COATED ORAL DAILY
Qty: 90 TABLET | Refills: 2 | Status: SHIPPED | OUTPATIENT
Start: 2022-07-09 | End: 2023-03-16

## 2022-07-09 NOTE — TELEPHONE ENCOUNTER
"Last Written Prescription Date:  6/15/22  Last Fill Quantity: 30,  # refills: 1   Last office visit provider:  5/4/22     Requested Prescriptions   Pending Prescriptions Disp Refills     sertraline (ZOLOFT) 100 MG tablet 30 tablet 1     Sig: Take 1 tablet (100 mg) by mouth daily       SSRIs Protocol Passed - 7/8/2022 10:40 AM        Passed - Recent (12 mo) or future (30 days) visit within the authorizing provider's specialty     Patient has had an office visit with the authorizing provider or a provider within the authorizing providers department within the previous 12 mos or has a future within next 30 days. See \"Patient Info\" tab in inbasket, or \"Choose Columns\" in Meds & Orders section of the refill encounter.              Passed - Medication is active on med list        Passed - Patient is age 18 or older        Passed - No active pregnancy on record        Passed - No positive pregnancy test in last 12 months             Peg Gaffney 07/09/22 3:32 PM  "

## 2022-07-17 ENCOUNTER — HEALTH MAINTENANCE LETTER (OUTPATIENT)
Age: 22
End: 2022-07-17

## 2022-09-24 ENCOUNTER — HEALTH MAINTENANCE LETTER (OUTPATIENT)
Age: 22
End: 2022-09-24

## 2023-03-16 ENCOUNTER — OFFICE VISIT (OUTPATIENT)
Dept: FAMILY MEDICINE | Facility: CLINIC | Age: 23
End: 2023-03-16
Payer: COMMERCIAL

## 2023-03-16 VITALS
BODY MASS INDEX: 31.36 KG/M2 | DIASTOLIC BLOOD PRESSURE: 76 MMHG | WEIGHT: 188.2 LBS | TEMPERATURE: 99.1 F | RESPIRATION RATE: 14 BRPM | HEART RATE: 95 BPM | HEIGHT: 65 IN | SYSTOLIC BLOOD PRESSURE: 146 MMHG | OXYGEN SATURATION: 98 %

## 2023-03-16 DIAGNOSIS — Z11.3 SCREENING FOR STDS (SEXUALLY TRANSMITTED DISEASES): ICD-10-CM

## 2023-03-16 DIAGNOSIS — Z00.00 ROUTINE HISTORY AND PHYSICAL EXAMINATION OF ADULT: Primary | ICD-10-CM

## 2023-03-16 DIAGNOSIS — F41.1 GENERALIZED ANXIETY DISORDER: ICD-10-CM

## 2023-03-16 DIAGNOSIS — Z12.4 CERVICAL CANCER SCREENING: ICD-10-CM

## 2023-03-16 DIAGNOSIS — F33.1 MODERATE RECURRENT MAJOR DEPRESSION (H): ICD-10-CM

## 2023-03-16 PROCEDURE — 87591 N.GONORRHOEAE DNA AMP PROB: CPT | Performed by: FAMILY MEDICINE

## 2023-03-16 PROCEDURE — 90471 IMMUNIZATION ADMIN: CPT | Performed by: FAMILY MEDICINE

## 2023-03-16 PROCEDURE — 90715 TDAP VACCINE 7 YRS/> IM: CPT | Performed by: FAMILY MEDICINE

## 2023-03-16 PROCEDURE — 87491 CHLMYD TRACH DNA AMP PROBE: CPT | Performed by: FAMILY MEDICINE

## 2023-03-16 PROCEDURE — 96127 BRIEF EMOTIONAL/BEHAV ASSMT: CPT | Performed by: FAMILY MEDICINE

## 2023-03-16 PROCEDURE — 99213 OFFICE O/P EST LOW 20 MIN: CPT | Mod: 25 | Performed by: FAMILY MEDICINE

## 2023-03-16 PROCEDURE — 99395 PREV VISIT EST AGE 18-39: CPT | Mod: 25 | Performed by: FAMILY MEDICINE

## 2023-03-16 ASSESSMENT — ENCOUNTER SYMPTOMS
NAUSEA: 0
FREQUENCY: 0
NERVOUS/ANXIOUS: 1
SORE THROAT: 0
ARTHRALGIAS: 1
HEARTBURN: 0
JOINT SWELLING: 0
DYSURIA: 0
SHORTNESS OF BREATH: 0
MYALGIAS: 0
HEADACHES: 0
CHILLS: 0
PARESTHESIAS: 0
WEAKNESS: 0
CONSTIPATION: 0
DIZZINESS: 0
FEVER: 0
COUGH: 1
HEMATOCHEZIA: 0
BREAST MASS: 0
HEMATURIA: 0
ABDOMINAL PAIN: 0
EYE PAIN: 0
DIARRHEA: 0
PALPITATIONS: 0

## 2023-03-16 ASSESSMENT — PAIN SCALES - GENERAL: PAINLEVEL: NO PAIN (0)

## 2023-03-16 ASSESSMENT — PATIENT HEALTH QUESTIONNAIRE - PHQ9
10. IF YOU CHECKED OFF ANY PROBLEMS, HOW DIFFICULT HAVE THESE PROBLEMS MADE IT FOR YOU TO DO YOUR WORK, TAKE CARE OF THINGS AT HOME, OR GET ALONG WITH OTHER PEOPLE: SOMEWHAT DIFFICULT
SUM OF ALL RESPONSES TO PHQ QUESTIONS 1-9: 14
SUM OF ALL RESPONSES TO PHQ QUESTIONS 1-9: 14

## 2023-03-16 NOTE — PROGRESS NOTES
SUBJECTIVE:   CC: Paulette is an 22 year old who presents for preventive health visit.   Reviewed her health history including medications, allergies, family and social history.  She has a history of anxiety.  No longer taking sertraline.  Discontinue that due to side effects.  Does have gabapentin that she will take occasionally.  Does not take that very often because makes her tired.  She does have some symptoms of depression.  Feels that anxiety is the primary issue.  She does not like to take medication.  She is interested in finding a therapist to help with her anxiety and depression.  She uses Nexplanon.  This was placed in June 2021.  Has noticed some increased menstrual cramping recently.  Has irregular menstrual bleeding.  She uses ondansetron as needed for nausea.  She is working in a group home.  Job is physically active.  She is working on some healthy lifestyle changes and trying to get more regular exercise and improve her diet.  She drinks alcohol rarely.  Does use marijuana on a regular basis.  Finds this helps a bit with her anxiety and helps her sleep.  She has some sleep difficulties.  Has trouble falling asleep and wakes up early.  She does have a tendency to get some diarrhea.  Review of systems is otherwise negative.  No other concerns or questions      HPI            Today's PHQ-2 Score:   PHQ-2 ( 1999 Pfizer) 3/16/2023   Q1: Little interest or pleasure in doing things 1   Q2: Feeling down, depressed or hopeless 1   PHQ-2 Score 2   Q1: Little interest or pleasure in doing things Several days   Q2: Feeling down, depressed or hopeless Several days   PHQ-2 Score 2           Social History     Tobacco Use     Smoking status: Never     Smokeless tobacco: Never     Tobacco comments:     vapes nicotine   Substance Use Topics     Alcohol use: Yes         Alcohol Use 3/16/2023   Prescreen: >3 drinks/day or >7 drinks/week? No       Reviewed orders with patient.  Reviewed health maintenance and updated  "orders accordingly - Yes  Current Outpatient Medications   Medication Sig Dispense Refill     etonogestreL (NEXPLANON) 68 mg Impl implant [ETONOGESTREL (NEXPLANON) 68 MG IMPL IMPLANT] 1 each by Subdermal route once.       gabapentin (NEURONTIN) 100 MG capsule Take 1 capsule (100 mg) by mouth 3 times daily (Patient taking differently: Take 100 mg by mouth 3 times daily as needed) 90 capsule 1     ondansetron (ZOFRAN) 4 MG tablet Take 1 tablet (4 mg) by mouth every 6 hours as needed for nausea or vomiting 20 tablet 0       Breast Cancer Screening:        History of abnormal Pap smear: NO - age 21-29 PAP every 3 years recommended     Reviewed and updated as needed this visit by clinical staff   Tobacco  Allergies  Meds              Reviewed and updated as needed this visit by Provider     Meds                 Review of Systems       OBJECTIVE:   BP (!) 146/76 (BP Location: Right arm, Patient Position: Sitting, Cuff Size: Adult Regular)   Pulse 95   Temp 99.1  F (37.3  C) (Temporal)   Resp 14   Ht 1.64 m (5' 4.57\")   Wt 85.4 kg (188 lb 3.2 oz)   SpO2 98%   BMI 31.74 kg/m    Physical Exam  GENERAL: healthy, alert and no distress  EYES: Eyes grossly normal to inspection, PERRL and conjunctivae and sclerae normal  HENT: normal cephalic/atraumatic and ear canals and TM's normal  RESP: lungs clear to auscultation - no rales, rhonchi or wheezes  BREAST: normal without masses, tenderness or nipple discharge and no palpable axillary masses or adenopathy  CV: regular rate and rhythm, normal S1 S2, no S3 or S4, no murmur, click or rub, no peripheral edema and peripheral pulses strong  ABDOMEN: soft, nontender, no hepatosplenomegaly, no masses and bowel sounds normal   (female): normal female external genitalia, normal urethral meatus , vaginal mucosa pink, moist, well rugated and cervix normal  MS: no gross musculoskeletal defects noted, no edema  SKIN: no suspicious lesions or rashes  NEURO: Normal strength and " "tone, mentation intact and speech normal  PSYCH: mentation appears normal, affect normal/bright        ASSESSMENT/PLAN:   Paulette was seen today for physical.    Diagnoses and all orders for this visit:    Routine history and physical examination of adult  Pap smear performed.  Tetanus booster administered.  She declines COVID booster.  Encouraged regular vision and dental exams.  Encouraged regular exercise, healthy diet and weight loss efforts.  Follow-up in 1 year for annual physical    Screening for STDs (sexually transmitted diseases)  -     NEISSERIA GONORRHOEA PCR; Future  -     CHLAMYDIA TRACHOMATIS PCR; Future  -     CHLAMYDIA TRACHOMATIS PCR  -     NEISSERIA GONORRHOEA PCR    Cervical cancer screening  -     Pap Screen only - recommended age 21 - 24 years    Generalized anxiety disorder  -     Adult Mental Health  Referral; Future    Moderate recurrent major depression (H)  -     Adult Mental Health  Referral; Future    Other orders  -     REVIEW OF HEALTH MAINTENANCE PROTOCOL ORDERS  -     TDAP VACCINE (Adacel, Boostrix)              COUNSELING:  Reviewed preventive health counseling, as reflected in patient instructions      BMI:   Estimated body mass index is 31.74 kg/m  as calculated from the following:    Height as of this encounter: 1.64 m (5' 4.57\").    Weight as of this encounter: 85.4 kg (188 lb 3.2 oz).   Weight management plan: Discussed healthy diet and exercise guidelines      She reports that she has never smoked. She has never used smokeless tobacco.          Christin Isaacs MD  [facility]  Answers for HPI/ROS submitted by the patient on 3/16/2023  If you checked off any problems, how difficult have these problems made it for you to do your work, take care of things at home, or get along with other people?: Somewhat difficult  PHQ9 TOTAL SCORE: 14      "

## 2023-03-17 LAB
C TRACH DNA SPEC QL NAA+PROBE: NEGATIVE
N GONORRHOEA DNA SPEC QL NAA+PROBE: NEGATIVE

## 2023-03-21 LAB
BKR LAB AP GYN ADEQUACY: NORMAL
BKR LAB AP GYN INTERPRETATION: NORMAL
BKR LAB AP HPV REFLEX: NO
BKR LAB AP PREVIOUS ABNORMAL: NORMAL
PATH REPORT.COMMENTS IMP SPEC: NORMAL
PATH REPORT.COMMENTS IMP SPEC: NORMAL
PATH REPORT.RELEVANT HX SPEC: NORMAL

## 2023-07-12 ENCOUNTER — HOSPITAL ENCOUNTER (EMERGENCY)
Facility: CLINIC | Age: 23
Discharge: HOME | End: 2023-07-12
Attending: STUDENT IN AN ORGANIZED HEALTH CARE EDUCATION/TRAINING PROGRAM | Admitting: STUDENT IN AN ORGANIZED HEALTH CARE EDUCATION/TRAINING PROGRAM
Payer: COMMERCIAL

## 2023-07-12 VITALS
TEMPERATURE: 97.4 F | RESPIRATION RATE: 20 BRPM | HEART RATE: 82 BPM | SYSTOLIC BLOOD PRESSURE: 143 MMHG | DIASTOLIC BLOOD PRESSURE: 92 MMHG | OXYGEN SATURATION: 94 %

## 2023-07-12 DIAGNOSIS — L50.9 HIVES: ICD-10-CM

## 2023-07-12 PROCEDURE — 99282 EMERGENCY DEPT VISIT SF MDM: CPT

## 2023-07-12 ASSESSMENT — ACTIVITIES OF DAILY LIVING (ADL): ADLS_ACUITY_SCORE: 35

## 2023-07-12 NOTE — DISCHARGE INSTRUCTIONS
Return to the emergency department if symptoms are worsening, become concerning, or for any other concerns. Follow-up with your doctor in 2-3 and sooner if needed.    Stop taking the Augmentin.  Talk to your doctor regarding allergy testing.    Take Benadryl as needed for rash and itching.

## 2023-07-12 NOTE — ED PROVIDER NOTES
History     Chief Complaint:  Rash       The history is provided by the patient.      Paulette Harvey is a 22 year old female who presents with a rash. Patient reports she was seen at urgent care on 7/4 for right-sided otitis media and strep throat, which is when she was started on Augmentin. As of yesterday, she began noticing hand swelling and hives on her bilateral upper extremities, as well as on her upper bilateral lower extremities. She stopped taking her Augmentin last night (7/11). She denies any throat swelling/discomfort, nausea, vomiting, abdominal pain, shortness of breath, or  symptoms such as dysuria. No prior episodes of this kind of reaction.     No discoloration of urine.    Independent Historian:   None - Patient Only    Review of External Notes:   I reviewed the patient's Urgent Care note from 7/4.     Medications:    etonogestreL  gabapentin    Past Medical History:    Anxiety  Depression  Sinusitis  Bronchitis  Asthma     Physical Exam     Patient Vitals for the past 24 hrs:   BP Temp Temp src Pulse Resp SpO2   07/12/23 1445 (!) 143/92 -- -- 82 -- 94 %   07/12/23 1430 (!) 141/90 -- -- 80 -- 97 %   07/12/23 1415 135/83 -- -- 76 -- 98 %   07/12/23 0925 (!) 140/90 97.4  F (36.3  C) Temporal 110 20 98 %        Physical Exam   GENERAL: Patient well-appearing and in no acute distress.  HEAD: Atraumatic.  Ears: TMs clear  Neck: No rigidity    CV: RRR, no murmurs rubs or gallops  PULM: CTAB with good aeration; no retractions, rales, rhonchi, or wheezing  ABD: Soft, nontender, nondistended, no guarding  DERM: No rash. Skin warm and dry  Extremity: Blanching hives to bilateral thighs and right UE. No petechia or purpura. Slight swelling of her hands.   Throat: clear.  No erythema or exudate.  EXTREMITY: Moving all extremities without difficulty. No calf tenderness or peripheral edema  VASCULAR: Symmetric pulses bilaterally    Emergency Department Course     Emergency Department Course &  Assessments:    Independent Interpretation (X-rays, CTs, rhythm strip):  None    Assessments/Consultations/Discussion of Management or Tests:  ED Course as of 07/12/23 1544   Wed Jul 12, 2023   1422 Dr. Yoon' evaluation.      Social Determinants of Health affecting care:   None    Disposition:  The patient was discharged to home.     Impression & Plan      CMS Diagnoses: None    Medical Decision Making:  Symptoms most consistent with mild allergic reaction to Augmentin.    Vital signs unremarkable. Patient well-appearing and in no acute distress.     Conditions complicating - recent strep throat and ear infection on Augmentin.    DDx anaphylaxis, pheochromocytoma, carcinoid syndrome, however evaluation not consistent with these etiologies.     Do not think this is glomerulonephritis.    Recommended stopping the Augmentin.  She already took it for 8 days so do not think she needs to continue as her symptoms of all resolved from the throat and ear.    Recommend she follow-up with her doctor for allergy testing, and discussed that she potentially is allergic to Augmentin.    No indication for epinephrine or further emergent medications.  Patient has Benadryl at home which she can take.      Patient was evaluated for acute medical emergencies. Based on my clinical assessment, I do not think any further acute management or work-up is required.  Patient stable for discharge. Given strict return precautions. All questions answered. Patient content with plan. Recommended PCP follow-up in 2-3 days.        Diagnosis:    ICD-10-CM    1. Hives  L50.9         Scribe Disclosure:  I, GUILLERMO LAZO, am serving as a scribe at 2:03 PM on 7/12/2023 to document services personally performed by Calos Yoon MD based on my observations and the provider's statements to me.     7/12/2023   Calos Yoon MD Foss, Kevin, MD  07/12/23 1544

## 2023-07-12 NOTE — ED TRIAGE NOTES
Taking augmentin for strep. Noticed rash on hands and legs last night. Stopped taking abx. Took benadryl this morning. Denies airway involvement. ABCs intact. VSS.

## 2023-07-13 ENCOUNTER — NURSE TRIAGE (OUTPATIENT)
Dept: NURSING | Facility: CLINIC | Age: 23
End: 2023-07-13

## 2023-07-13 ENCOUNTER — VIRTUAL VISIT (OUTPATIENT)
Dept: INTERNAL MEDICINE | Facility: CLINIC | Age: 23
End: 2023-07-13
Payer: COMMERCIAL

## 2023-07-13 DIAGNOSIS — J03.01 ACUTE RECURRENT STREPTOCOCCAL TONSILLITIS: ICD-10-CM

## 2023-07-13 DIAGNOSIS — F33.41 RECURRENT MAJOR DEPRESSIVE DISORDER, IN PARTIAL REMISSION (H): ICD-10-CM

## 2023-07-13 DIAGNOSIS — L27.0 ALLERGIC DRUG RASH: Primary | ICD-10-CM

## 2023-07-13 PROBLEM — F32.9 MAJOR DEPRESSION: Status: RESOLVED | Noted: 2020-09-14 | Resolved: 2023-07-13

## 2023-07-13 PROBLEM — F33.9 MAJOR DEPRESSION, RECURRENT (H): Status: ACTIVE | Noted: 2023-07-13

## 2023-07-13 PROBLEM — F41.1 GENERALIZED ANXIETY DISORDER: Status: ACTIVE | Noted: 2020-09-14

## 2023-07-13 PROCEDURE — 96127 BRIEF EMOTIONAL/BEHAV ASSMT: CPT | Mod: VID | Performed by: INTERNAL MEDICINE

## 2023-07-13 PROCEDURE — 99213 OFFICE O/P EST LOW 20 MIN: CPT | Mod: VID | Performed by: INTERNAL MEDICINE

## 2023-07-13 RX ORDER — PENICILLIN V POTASSIUM 500 MG/1
TABLET, FILM COATED ORAL
COMMUNITY
Start: 2023-06-02 | End: 2023-07-13 | Stop reason: SINTOL

## 2023-07-13 RX ORDER — PREDNISONE 20 MG/1
20 TABLET ORAL EVERY MORNING
Qty: 7 TABLET | Refills: 0 | Status: SHIPPED | OUTPATIENT
Start: 2023-07-13 | End: 2023-07-20

## 2023-07-13 ASSESSMENT — PATIENT HEALTH QUESTIONNAIRE - PHQ9
10. IF YOU CHECKED OFF ANY PROBLEMS, HOW DIFFICULT HAVE THESE PROBLEMS MADE IT FOR YOU TO DO YOUR WORK, TAKE CARE OF THINGS AT HOME, OR GET ALONG WITH OTHER PEOPLE: SOMEWHAT DIFFICULT
SUM OF ALL RESPONSES TO PHQ QUESTIONS 1-9: 7
SUM OF ALL RESPONSES TO PHQ QUESTIONS 1-9: 7

## 2023-07-13 NOTE — PATIENT INSTRUCTIONS
Prednisone 20 mg daily until better than 10 mg daily x4 days    Discontinue Augmentin and enter as allergy    Allergy referral to discuss true allergic reaction or desensitization versus hypersensitivity reaction    ENT evaluation regarding recurrent strep without exposure    MyChart communication initiated

## 2023-07-13 NOTE — PROGRESS NOTES
"Paulette is a 22 year old who is being evaluated via a billable video visit.      How would you like to obtain your AVS? MyChart  If the video visit is dropped, the invitation should be resent by: Text to cell phone: [phone]  Will anyone else be joining your video visit? No  {If patient encounters technical issues they should call [phone] :[id number]}        {PROVIDER CHARTING PREFERENCE:573162}    Subjective   Paulette is a 22 year old, presenting for the following health issues:  Recheck Medication and office visit  (Pt is being seen due to hives )        7/13/2023     8:55 AM   Additional Questions   Roomed by varun   Accompanied by juan         7/13/2023     8:55 AM   Patient Reported Additional Medications   Patient reports taking the following new medications no     History of Present Illness       Reason for visit:  Due to hives    She eats 2-3 servings of fruits and vegetables daily.She consumes 1 sweetened beverage(s) daily.She exercises with enough effort to increase her heart rate 30 to 60 minutes per day.  She exercises with enough effort to increase her heart rate 7 days per week.     Today's PHQ-9         PHQ-9 Total Score: 7    PHQ-9 Q9 Thoughts of better off dead/self-harm past 2 weeks :   Not at all    How difficult have these problems made it for you to do your work, take care of things at home, or get along with other people: Somewhat difficult       {SUPERLIST (Optional):738742}  Pt is being seen due to hives from recently medication       Review of Systems   {ROS COMP (Optional):716346}      Objective           Vitals:  No vitals were obtained today due to virtual visit.    Physical Exam   {video visit exam brief selected:764927::\"GENERAL: Healthy, alert and no distress\",\"EYES: Eyes grossly normal to inspection.  No discharge or erythema, or obvious scleral/conjunctival abnormalities.\",\"RESP: No audible wheeze, cough, or visible cyanosis.  No visible retractions or increased work of " "breathing.  \",\"SKIN: Visible skin clear. No significant rash, abnormal pigmentation or lesions.\",\"NEURO: Cranial nerves grossly intact.  Mentation and speech appropriate for age.\",\"PSYCH: Mentation appears normal, affect normal/bright, judgement and insight intact, normal speech and appearance well-groomed.\"}    {Diagnostic Test Results (Optional):754661}    {AMBULATORY ATTESTATION (Optional):361251}        Video-Visit Details    Type of service:  Video Visit     Originating Location (pt. Location): Home  {PROVIDER LOCATION On-site should be selected for visits conducted from your clinic location or adjoining [facility] hospital, academic office, or other nearby [facility] building. Off-site should be selected for all other provider locations, including home:589687}  Distant Location (provider location):  {virtual location provider:916836}  Platform used for Video Visit: {Virtual Visit Platforms:895329::\"[employer]\"}    "

## 2023-07-13 NOTE — PROGRESS NOTES
Paulette is a 22 year old female contacting the clinic today via video, who will use the platform: Veri[employer] for the visit.  Phone # for [employer], or if Amwell drops:   Telephone Information:   Mobile [phone]   Mobile [phone]          ASSESSMENT and PLAN:  1. Allergic drug rash  Provide prednisone to supplement Benadryl.  Allergy referral to determine whether true allergic reaction or hypersensitivity and possibly comment on recurrent strep  - Adult Allergy/Asthma Referral; Future  - predniSONE (DELTASONE) 20 MG tablet; Take 1 tablet (20 mg) by mouth every morning for 7 days  Dispense: 7 tablet; Refill: 0    2. Acute recurrent streptococcal tonsillitis  Discussed ENT versus allergy.  No obvious exposure.  Unclear whether carrier, etc.  - Adult ENT  Referral; Future    3. Recurrent major depressive disorder, in partial remission (H)  Off meds.  Treated with counseling         Patient Instructions   Prednisone 20 mg daily until better than 10 mg daily x4 days    Discontinue Augmentin and enter as allergy    Allergy referral to discuss true allergic reaction or desensitization versus hypersensitivity reaction    ENT evaluation regarding recurrent strep without exposure    MyChart communication initiated            Return in about 6 months (around 1/13/2024) for using a video visit.       CHIEF COMPLAINT:  Chief Complaint   Patient presents with     Recheck Medication     office visit      Pt is being seen due to hives            7/13/2023     8:55 AM   Additional Questions   Roomed by varun   Accompanied by juan         7/13/2023     8:55 AM   Patient Reported Additional Medications   Patient reports taking the following new medications no       HISTORY OF PRESENT ILLNESS:  Paulette is a 22 year old female contacting the clinic today via video for complaints of an allergic drug rash.  She tested positive for strep on June 2 and was treated with penicillin.  She then tested positive for strep a second  "time on July 4 and was treated with Augmentin.  On July 10 she developed a rash and itching and took her last dose of Augmentin on July 11 p.m.  She went to the emergency room July 12, was diagnosed with an allergic drug rash, and was placed on Benadryl.  She contacted us today reporting that she is having intensifying itching and rash and wishes something stronger.  No throat closing, chest pain, shortness of breath or wheezing    No known exposure to strep for either episode June or July.  Currently feels well without sore throat or sinus congestion    History of Present Illness       Reason for visit:  Due to hives    She eats 2-3 servings of fruits and vegetables daily.She consumes 1 sweetened beverage(s) daily.She exercises with enough effort to increase her heart rate 30 to 60 minutes per day.  She exercises with enough effort to increase her heart rate 7 days per week.     Today's PHQ-9         PHQ-9 Total Score: 7    PHQ-9 Q9 Thoughts of better off dead/self-harm past 2 weeks :   Not at all    How difficult have these problems made it for you to do your work, take care of things at home, or get along with other people: Somewhat difficult      REVIEW OF SYSTEMS:   Depression treated with therapy, no medicines    PFSH:  Social History     Social History Narrative     Not on file         TOBACCO USE:  History   Smoking Status     Never   Smokeless Tobacco     Never       VITALS:  There were no vitals filed for this visit.  LMP  (LMP Unknown)  Estimated body mass index is 31.74 kg/m  as calculated from the following:    Height as of 3/16/23: 1.64 m (5' 4.57\").    Weight as of 3/16/23: 85.4 kg (188 lb 3.2 oz).    PHYSICAL EXAM:  (observations via Video)  Alert and oriented.  Flat papular lesions over the arms visualized.  No cough or shortness of breath    MEDICATIONS:   Current Outpatient Medications   Medication Sig Dispense Refill     etonogestreL (NEXPLANON) 68 mg Impl implant [ETONOGESTREL (NEXPLANON) 68 MG " IMPL IMPLANT] 1 each by Subdermal route once.       gabapentin (NEURONTIN) 100 MG capsule Take 1 capsule (100 mg) by mouth 3 times daily (Patient taking differently: Take 100 mg by mouth 3 times daily as needed) 90 capsule 1     ondansetron (ZOFRAN) 4 MG tablet Take 1 tablet (4 mg) by mouth every 6 hours as needed for nausea or vomiting 20 tablet 0     predniSONE (DELTASONE) 20 MG tablet Take 1 tablet (20 mg) by mouth every morning for 7 days 7 tablet 0       Outside Notes summarized: Strep positive x2  Labs, x-rays, cardiology, GI tests reviewed:   Recent Labs   Lab Test 03/13/22  1033 03/13/22  1032   HGB  --  14.5   WBC  --  18.3*     --    POTASSIUM 3.5  --    CR 0.82  --      Lab Results   Component Value Date    ENYWT66GHC Not Detected 11/20/2020     Lab Results   Component Value Date    CHOL 120 12/12/2018     New orders:   Orders Placed This Encounter   Procedures     Adult Allergy/Asthma Referral     Adult ENT  Referral       Independent review of:     Tomi Sanchez MD  [facility]    Video-Visit Details  Type of service:  Video Visit  Patient has given verbal consent to a Video visit?  Yes  How would you like to obtain your AVS?  MyChart  Will anyone else be joining your video visit, giving supplemental history? No    Originating location (pt location): Home    Distant Location (provider location):  Off-site    Video Start Time: 9:46 AM  Video End time:  10:07 AM  Face to face plus orders: 21 minutes  Documentation time:  3 minutes     The visit lasted a total of 23 minutes

## 2023-07-13 NOTE — TELEPHONE ENCOUNTER
S-(situation): Hives    B-(background):   Pt at ED yesterday for widespread hives.  Started Augmentin for strep throat on 7/4. Stopped Augmentin on 7/11. Advised at ED yesterday that throat looks fine and antibiotics were no longer needed.    Pt started taking Benadryl 25 mg yesterday at 9 AM. Takes it every 4 hours when awake.   Took Benadryl 25 mg at 8 AM today    A-(assessment):  Worsening hives, swollen hands and feet  Severe widespread itching    R-(recommendations):   Take another Bendrayl 25 mg (total of 50 mg) this morning.  Virtual visit recommended.  ED/call 911 for SOB, dysphagia, cough    Pt verbalized understanding. Transferred to . [facility] for video visit.    Bailey Polanco RN/[place] Nurse Advisor          Reason for Disposition   MODERATE-SEVERE hives persist (i.e., hives interfere with normal activities or work) and taking antihistamine (e.g., Benadryl, Claritin) > 24 hours    Additional Information   Negative: Difficulty breathing or wheezing now   Negative: Rapid onset of swollen tongue   Negative: Rapid onset of hoarseness or cough   Negative: Very weak (can't stand)   Negative: Difficult to awaken or acting confused (e.g., disoriented, slurred speech)   Negative: Life-threatening reaction (anaphylaxis) in the past to similar substance (e.g., food, insect bite/sting, chemical, etc.) and < 2 hours since exposure   Negative: Sounds like a life-threatening emergency to the triager   Negative: Bee, wasp, or yellow jacket sting within last 24 hours   Negative: Taking a new medicine now or within last 3 days  (Exception: Antihistamine, decongestant or other OTC cough/cold medicines.)   Negative: Doesn't match the SYMPTOMS of hives   Negative: Swollen tongue   Negative: Widespread hives, itching, or facial swelling and onset < 2 hours of exposure to high-risk allergen (e.g., 1st dose of antibiotic, nuts, sting)   Negative: Patient sounds very sick or weak to the triager    Protocols used:  Hives-A-OH

## 2023-11-17 ENCOUNTER — OFFICE VISIT (OUTPATIENT)
Dept: ALLERGY | Facility: CLINIC | Age: 23
End: 2023-11-17
Payer: COMMERCIAL

## 2023-11-17 VITALS — HEART RATE: 92 BPM | OXYGEN SATURATION: 98 % | WEIGHT: 183 LBS | BODY MASS INDEX: 29.41 KG/M2 | HEIGHT: 66 IN

## 2023-11-17 DIAGNOSIS — T50.905D ADVERSE EFFECT OF DRUG, SUBSEQUENT ENCOUNTER: ICD-10-CM

## 2023-11-17 DIAGNOSIS — Z88.1 ALLERGIC REACTION TO AUGMENTIN: Primary | ICD-10-CM

## 2023-11-17 DIAGNOSIS — L27.0 ALLERGIC DRUG RASH: ICD-10-CM

## 2023-11-17 PROCEDURE — 99203 OFFICE O/P NEW LOW 30 MIN: CPT | Mod: 25 | Performed by: ALLERGY & IMMUNOLOGY

## 2023-11-17 PROCEDURE — 95018 ALL TSTG PERQ&IQ DRUGS/BIOL: CPT | Performed by: ALLERGY & IMMUNOLOGY

## 2023-11-17 NOTE — PROGRESS NOTES
"      Raffaele Caldwell is a 23 year old, presenting for the following health issues:  Allergy Consult (Trouble with amoxicillin (body hives) and sulfa (hyper emesis) this summer)    HPI     Chief complaint: Amoxicillin allergy, nitrofurantoin allergy      History of present illness: This is a pleasant 23-year-old woman I was asked to see for evaluation by Dr. Sanchez in regards to medication allergy.  Patient states this summer she was treated for strep and ear infection with Augmentin.  2 or 3 days and she developed a red itchy welts on her hands and arms.  After taking Benadryl, it spread to her feet.  She then had a virtual visit was given prednisone.  This did help.  She was stopped on the antibiotic.  No mucosal lesions or skin sloughing.  She thinks she had amoxicillin before as a child.  No shortness of breath.    She does note that last year she had nitrofurantoin and developed repeated vomiting after being on this medication.  No hives, swelling or shortness of breath.    Past medical history: Otherwise unremarkable    Social history: She has 2 cats at home, smoker    Family history: Noncontributory      Review of Systems   Constitutional, HEENT, cardiovascular, pulmonary, gi and gu systems are negative, except as otherwise noted.      Objective    Pulse 92   Ht 1.664 m (5' 5.5\")   Wt 83 kg (183 lb)   SpO2 98%   BMI 29.99 kg/m    Body mass index is 29.99 kg/m .  Physical Exam     Gen: Pleasant female not in acute distress  HEENT: Eyes no erythema of the bulbar or palpebral conjunctiva, no edema. Ears: No deformities or lesions. Nose: No congestion,  Mouth: Throat clear, no lip or tongue edema.   Neck: No masses lesions or swelling  Respiratory: No coughing with breathing, no retractions  Lymph: No visible supraclavicular or cervical lymphadenopathy  Skin: No rashes or lesions  Psych: Alert and appropriate for age      At today s visit the patient/parent and I engaged in an informed consent " discussion about allergy testing.  We discussed skin testing, blood testing,  and the alternative of not undergoing any testing. The patient/ parent has a preference for skin testing. We then discussed the risks and benefits of skin testing.  The patient/ parent understands skin testing risks can include, but are not limited to, urticaria, angioedema, shortness of breath, and severe anaphylaxis.  The benefits include, but are not limited, to evaluation for allergens causing symptoms.  After answering the patients/parents questions they have agreed to proceed with skin testing.    Controls     Needle Type --   Negative 0.05% Glycerine-Saline (W/F in millimeters) 0   Positive Histamine 1 mg/ml (W/F in millimeters) --   Positive Histamine 6 mg/ml (W/F in millimeters) 5/20   Intradermal Neg. 50% Control: Glycerine-Saline H (W/F in mm) 0   Antibiotics     Pre Pen Prick 0   Pre Pen ID --   Pre Pen Intradermal #1 (W/F in mm) 0   Pre Pen Intradermal #2 (W/F in mm) 0   Penicillin G (10,000 u/ml) Prick 0   Penicillin G (10,000 u/ml) ID --   Penicillin G (10,000 u/ml) Intradermal #1 (W/F in mm) 0   PenicilliPenicillin G (10,000 u/ml) Intradermal #2 (W/F in mm)n G (10,000 u/ml) Intradermal #1 (W/F in mm) 0         Impression report and plan:    Augmentin reaction  Patient now requires amoxicillin challenge.  Would keep clavulante on allergy profile.  Risk of anaphylaxis discussed.  Patient was scheduled while here.    2.  Nitrofurantoin reaction    This sounds like more adverse reaction rather than allergy.  This can continue to remain on her allergy list.

## 2023-11-17 NOTE — LETTER
"    11/17/2023         RE: Paulette Harvey  [address]  [address]  [facility] [place] [address]        Dear Colleague,    Thank you for referring your patient, Paulette Harvey, to the [facility]. Please see a copy of my visit note below.          Subjective  Paulette is a 23 year old, presenting for the following health issues:  Allergy Consult (Trouble with amoxicillin (body hives) and sulfa (hyper emesis) this summer)    HPI     Chief complaint: Amoxicillin allergy, nitrofurantoin allergy      History of present illness: This is a pleasant 23-year-old woman I was asked to see for evaluation by Dr. Sanchez in regards to medication allergy.  Patient states this summer she was treated for strep and ear infection with Augmentin.  2 or 3 days and she developed a red itchy welts on her hands and arms.  After taking Benadryl, it spread to her feet.  She then had a virtual visit was given prednisone.  This did help.  She was stopped on the antibiotic.  No mucosal lesions or skin sloughing.  She thinks she had amoxicillin before as a child.  No shortness of breath.    She does note that last year she had nitrofurantoin and developed repeated vomiting after being on this medication.  No hives, swelling or shortness of breath.    Past medical history: Otherwise unremarkable    Social history: She has 2 cats at home, smoker    Family history: Noncontributory      Review of Systems   Constitutional, HEENT, cardiovascular, pulmonary, gi and gu systems are negative, except as otherwise noted.      Objective   Pulse 92   Ht 1.664 m (5' 5.5\")   Wt 83 kg (183 lb)   SpO2 98%   BMI 29.99 kg/m    Body mass index is 29.99 kg/m .  Physical Exam     Gen: Pleasant female not in acute distress  HEENT: Eyes no erythema of the bulbar or palpebral conjunctiva, no edema. Ears: No deformities or lesions. Nose: No congestion,  Mouth: Throat clear, no lip or tongue edema.   Neck: No masses lesions or " swelling  Respiratory: No coughing with breathing, no retractions  Lymph: No visible supraclavicular or cervical lymphadenopathy  Skin: No rashes or lesions  Psych: Alert and appropriate for age      At today s visit the patient/parent and I engaged in an informed consent discussion about allergy testing.  We discussed skin testing, blood testing,  and the alternative of not undergoing any testing. The patient/ parent has a preference for skin testing. We then discussed the risks and benefits of skin testing.  The patient/ parent understands skin testing risks can include, but are not limited to, urticaria, angioedema, shortness of breath, and severe anaphylaxis.  The benefits include, but are not limited, to evaluation for allergens causing symptoms.  After answering the patients/parents questions they have agreed to proceed with skin testing.    Controls     Needle Type --   Negative 0.05% Glycerine-Saline (W/F in millimeters) 0   Positive Histamine 1 mg/ml (W/F in millimeters) --   Positive Histamine 6 mg/ml (W/F in millimeters) 5/20   Intradermal Neg. 50% Control: Glycerine-Saline H (W/F in mm) 0   Antibiotics     Pre Pen Prick 0   Pre Pen ID --   Pre Pen Intradermal #1 (W/F in mm) 0   Pre Pen Intradermal #2 (W/F in mm) 0   Penicillin G (10,000 u/ml) Prick 0   Penicillin G (10,000 u/ml) ID --   Penicillin G (10,000 u/ml) Intradermal #1 (W/F in mm) 0   PenicilliPenicillin G (10,000 u/ml) Intradermal #2 (W/F in mm)n G (10,000 u/ml) Intradermal #1 (W/F in mm) 0         Impression report and plan:    Augmentin reaction  Patient now requires amoxicillin challenge.  Would keep clavulante on allergy profile.  Risk of anaphylaxis discussed.  Patient was scheduled while here.    2.  Nitrofurantoin reaction    This sounds like more adverse reaction rather than allergy.  This can continue to remain on her allergy list.        Again, thank you for allowing me to participate in the care of your patient.         Sincerely,        Amy WHITMAN MD

## 2023-12-11 ENCOUNTER — OFFICE VISIT (OUTPATIENT)
Dept: ALLERGY | Facility: CLINIC | Age: 23
End: 2023-12-11
Payer: COMMERCIAL

## 2023-12-11 VITALS — HEART RATE: 99 BPM | OXYGEN SATURATION: 100 % | RESPIRATION RATE: 18 BRPM

## 2023-12-11 DIAGNOSIS — L27.0 AMOXICILLIN-INDUCED ALLERGIC RASH: Primary | ICD-10-CM

## 2023-12-11 DIAGNOSIS — T36.0X5A AMOXICILLIN-INDUCED ALLERGIC RASH: Primary | ICD-10-CM

## 2023-12-11 PROCEDURE — 95076 INGEST CHALLENGE INI 120 MIN: CPT | Performed by: ALLERGY & IMMUNOLOGY

## 2023-12-11 NOTE — PROGRESS NOTES
Raffaele Caldwell is a 23 year old, presenting for the following health issues:  Drug Challenge (Amoxicillin challenge)    HPI     Chief complaint:  Amoxicillin allergy    History of Present illness:  This is a pleasant 23 year old woman here today to undergo amoxicillin allergy testing.  She reports she is doing well.  No cough, wheeze, shortness of breath, nasal congestion or skin rash.          Objective    Pulse 99   Resp 18   SpO2 100%   There is no height or weight on file to calculate BMI.  Physical Exam   Gen: Pleasant female not in acute distress  HEENT: Eyes no erythema of the bulbar or palpebral conjunctiva, no edema. Nose: No congestion, Mouth: Throat clear, no lip or tongue edema.     Respiratory: Clear to auscultation bilaterally, no adventitious breath sounds    Skin: No rashes or lesions  Psych: Alert and oriented times 3    Oral Challenge     Antibiotic/Concentration -- Amoxicillin 250 mg/5ml   1/100 Dose -- --   1/10 Dose -- 1 ml at 1240   Full Dose -- 10 ml at 1311   Observation -- Discharged at 1413       Start time:  1240  End time: 1413    Impression report and plan:   Amoxicillin allergy  Patient has a 2-6% chance of having an IgE mediated reaction to penicillin antibiotic.  This does not predict non-IgE mediated reactions.  Would keep clavulanic acid on her profile.

## 2024-02-15 ENCOUNTER — PATIENT OUTREACH (OUTPATIENT)
Dept: CARE COORDINATION | Facility: CLINIC | Age: 24
End: 2024-02-15
Payer: COMMERCIAL

## 2024-02-29 ENCOUNTER — PATIENT OUTREACH (OUTPATIENT)
Dept: CARE COORDINATION | Facility: CLINIC | Age: 24
End: 2024-02-29
Payer: COMMERCIAL

## 2024-05-05 ENCOUNTER — HEALTH MAINTENANCE LETTER (OUTPATIENT)
Age: 24
End: 2024-05-05

## 2024-08-05 NOTE — PROGRESS NOTES
Assessment/Plan:   Paulette is a 23 year old female here for physical     Routine adult health maintenance  Physical completed today.  Up-to-date with Pap smear.  No blood work indicated.  Up-to-date with immunizations.    Nexplanon in place  Nexplanon in place and due for removal, patient desires replacement, will message patient with options for this appointment.       I have had an Advance Directives discussion with the patient.    Follow up: 1 year for physical, sooner as needed    Rebecca Cardenas MD  [facility]      Subjective:     Paulette Harvey is a 23 year old female who presents for an annual exam.     Question 8/8/2024  8:33 AM CDT - Filed by Patient   Over the last 2 weeks, how often have you been bothered by any of the following problems?    1. Little interest or pleasure in doing things Not at all   2.  Feeling down, depressed, or hopeless Not at all   3.  Trouble falling or staying asleep, or sleeping too much Not at all   4.  Feeling tired or having little energy Several days   5.  Poor appetite or overeating Several days   6.  Feeling bad about yourself - or that you are a failure or have let yourself or your family down Not at all   7.  Trouble concentrating on things, such as reading the newspaper or watching television Not at all   8.  Moving or speaking so slowly that other people could have noticed. Or the opposite - being so fidgety or restless that you have been moving around a lot more than usual Not at all   9.  Thoughts that you would be better off dead, or of hurting yourself in some way Not at all   If you checked off any problems, how difficult have these problems made it for you to do your work, take care of things at home, or get along with other people? Not difficult at all   PHQ9 TOTAL SCORE (range: 0 - 27) 2 (Minimal depression)     Question 8/8/2024  9:06 AM CDT - Filed by Patient   In general, how would you rate your overall physical health? Good   Do you get at  least 3 servings of foods that have calcium each day (dairy, green leafy vegetables, etc)? (!) NO   Do you have a special diet? Other   If other, please elaborate: Avoid dairy   How many servings of fruits and vegetables do you eat daily? (!) 0-1   On average, how many sweetened beverages do you drink each day (Examples: soda, juice, sweet tea, etc.)? Do NOT count diet or artificially sweetened beverages. 0-1   On average, how many days per week do you engage in moderate to strenuous exercise (like a brisk walk)? 3 days   On average, how many minutes do you engage in exercise at this level? 20 min   How often do you get together with friends or relatives? Twice a week   Do you see a dentist two times every year? Yes   Do you feel stress - tense, restless, nervous, or anxious, or unable to sleep at night because your mind is troubled all the time - these days? To some extent   If you drink alcohol, do you typically have more than 3 drinks per day OR more than 7 drinks per week? Yes Abnormal    Do you use any substances not prescribed by a provider, out of habit or for other reasons? (!) CANNABIS PRODUCTS   Have you had any new sexual partners since you were last tested for sexually transmitted infections, including HIV? (!) YES    Do you have any questions about contraception (birth control) or family planning? (!) YES    Within the past 12 months, did you worry that your food would run out before you got money to buy more? No   Within the past 12 months, did the food you bought just not last and you didn t have money to get more? No   Do you have housing? (Housing is defined as stable permanent housing and does not include staying ouside in a car, in a tent, in an abandoned building, in an overnight shelter, or couch-surfing.) No   Are you worried about losing your housing? No   Within the past 12 months, has lack of transportation kept you from medical appointments, getting your medicines, non-medical meetings or  appointments, work, or from getting things that you need? No   Within the past 12 months, have you or your family members you live with been unable to get utilities (heat, electricity) when it was really needed? No   Would you like to be contacted by a care coordinator to help with housing and/or utility needs? No   Were you born outside of the [place]? No     Question 8/8/2024  9:06 AM CDT - Filed by Patient   Please use the following picture for reference only:    1.  How often do you have a drink containing alcohol? 2 to 4 times a month   2.  How many drinks containing alcohol do you have on a typical day when you are drinking? 5 or 6   3.  How often do you have five or more drinks on one occasion? Monthly   Audit 2/3 Score (range: 0 - 8) 4     Question 8/8/2024  9:06 AM CDT - Filed by Patient   Please use the following picture for reference only:    4.  How often during the last year have you found that you were not able to stop drinking once you had started? Never   5.  How often during the last year have you failed to do what was normally expected of you because of drinking? Never   6.  How often during the last year have you needed a first drink in the morning to get yourself going after a heavy drinking session? Never   7.  How often during the last year have you had a feeling of guilt or remorse after drinking? Never   8.  How often during the last year have you been unable to remember what happened the night before because of your drinking? Less than monthly     Question 8/8/2024  9:06 AM CDT - Filed by Patient   Please use the following picture for reference only:    9.  Have you or someone else been injured because of your drinking? No   10. Has a relative, friend, doctor or other health care worker been concerned about your drinking or suggested you cut down? No   TOTAL SCORE - AUDIT (range: 0 - 40) 7     Anxiety doing much better!    Health Maintenance reviewed:  Lipid Profile: no  Glucose Screen:  no  Colonoscopy: no  Mammogram: no    Gynecologic History  No LMP recorded. Patient has had an implant. Due for removal/replacement - gets regular periods still, end of the month  Contraception: Nexplanon  Last Pap: 2023. Results were: nml - due in 3 yrs    Immunization History   Administered Date(s) Administered    COVID-19 Vaccine (Violeta) 01/15/2022    DTaP, Unspecified 2000, 01/22/2001, 04/25/2001, 10/08/2001, 09/19/2005    HEPATITIS A (PEDS 12M-18Y) 05/19/2015, 11/13/2015    HPV Quadrivalent 08/14/2012, 08/14/2013, 11/06/2014    HepA, Unspecified 11/06/2014    HepB, Unspecified 2000, 08/14/2013, 11/06/2014    MMR 10/08/2001, 09/19/2005    Meningococcal ACWY (Menactra ) 08/14/2012, 11/17/2016    Poliovirus, inactivated (IPV) 2000, 01/22/2001, 04/25/2001, 09/19/2005    TDAP (Adacel,Boostrix) 08/14/2012, 03/16/2023     Immunization status: up to date and documented.    Current Outpatient Medications   Medication Sig Dispense Refill    etonogestreL (NEXPLANON) 68 mg Impl implant [ETONOGESTREL (NEXPLANON) 68 MG IMPL IMPLANT] 1 each by Subdermal route once.      ondansetron (ZOFRAN) 4 MG tablet Take 1 tablet (4 mg) by mouth every 6 hours as needed for nausea or vomiting 20 tablet 0     History reviewed. No pertinent past medical history.  History reviewed. No pertinent surgical history.  Macrobid [nitrofurantoin] and Clavulanic acid  Family History   Problem Relation Age of Onset    No Known Problems Mother     No Known Problems Father     Diabetes Maternal Grandmother     Tremor Maternal Grandfather     No Known Problems Paternal Grandmother     No Known Problems Paternal Grandfather      Social History     Socioeconomic History    Marital status: Single     Spouse name: Not on file    Number of children: Not on file    Years of education: Not on file    Highest education level: Not on file   Occupational History    Not on file   Tobacco Use    Smoking status: Never    Smokeless tobacco: Never     Tobacco comments:     vapes nicotine   Vaping Use    Vaping status: Every Day    Substances: Nicotine   Substance and Sexual Activity    Alcohol use: Yes    Drug use: Yes     Types: Marijuana    Sexual activity: Not Currently     Birth control/protection: I.U.D.   Other Topics Concern    Not on file   Social History Narrative    Not on file     Social Determinants of Health     Financial Resource Strain: Low Risk  (8/8/2024)    Financial Resource Strain     Within the past 12 months, have you or your family members you live with been unable to get utilities (heat, electricity) when it was really needed?: No   Food Insecurity: Low Risk  (8/8/2024)    Food Insecurity     Within the past 12 months, did you worry that your food would run out before you got money to buy more?: No     Within the past 12 months, did the food you bought just not last and you didn t have money to get more?: No   Transportation Needs: Low Risk  (8/8/2024)    Transportation Needs     Within the past 12 months, has lack of transportation kept you from medical appointments, getting your medicines, non-medical meetings or appointments, work, or from getting things that you need?: No   Physical Activity: Insufficiently Active (8/8/2024)    Exercise Vital Sign     Days of Exercise per Week: 3 days     Minutes of Exercise per Session: 20 min   Stress: Stress Concern Present (8/8/2024)    [employer] - Occupational Stress Questionnaire     Feeling of Stress : To some extent   Social Connections: Unknown (8/8/2024)    Social Connection and Isolation Panel [NHANES]     Frequency of Communication with Friends and Family: Not on file     Frequency of Social Gatherings with Friends and Family: Twice a week     Attends Gnosticist Services: Not on file     Active Member of Clubs or Organizations: Not on file     Attends Club or Organization Meetings: Not on file     Marital Status: Not on file   Interpersonal Safety: Low Risk   "(8/8/2024)    Interpersonal Safety     Do you feel physically and emotionally safe where you currently live?: Yes     Within the past 12 months, have you been hit, slapped, kicked or otherwise physically hurt by someone?: No     Within the past 12 months, have you been humiliated or emotionally abused in other ways by your partner or ex-partner?: No   Housing Stability: High Risk (8/8/2024)    Housing Stability     Do you have housing? : No     Are you worried about losing your housing?: No       Review of Systems negative unless noted    Objective:        Vitals:    08/08/24 0956   BP: 110/72   Pulse: 81   Resp: 14   Temp: 98  F (36.7  C)   TempSrc: Temporal   SpO2: 97%   Weight: 79.2 kg (174 lb 9.6 oz)   Height: 1.651 m (5' 5\")   PainSc: No Pain (0)     Body mass index is 29.05 kg/m .    Physical Exam:  General Appearance: Alert, pleasant, appears stated age  Head: Normocephalic, without obvious abnormality  Eyes: PERRL, conjunctiva/corneas clear, EOM's intact  Ears: Normal TM's and external ear canals, both ears  Nose: Nares normal, septum midline,mucosa normal, no drainage  Throat: Lips, mucosa, and tongue normal; teeth and gums normal; oropharynx is clear  Neck: Supple,without lymphadenopathy, no thyromegaly or nodules noted  Lungs: Clear to auscultation bilaterally, respirations unlabored, no wheezing or crackles  Heart: Regular rate and rhythm, no murmur   Abdomen: Soft, non-tender, no masses, no organomegaly  Extremities: Extremities with strong and symmetric pulses, no cyanosis or edema  Skin: Skin color, texture normal, no rashes or lesions  Neurologic: Grossly normal, no focal deficits      "

## 2024-08-08 ENCOUNTER — OFFICE VISIT (OUTPATIENT)
Dept: FAMILY MEDICINE | Facility: CLINIC | Age: 24
End: 2024-08-08
Payer: COMMERCIAL

## 2024-08-08 VITALS
BODY MASS INDEX: 29.09 KG/M2 | DIASTOLIC BLOOD PRESSURE: 72 MMHG | TEMPERATURE: 98 F | HEIGHT: 65 IN | HEART RATE: 81 BPM | OXYGEN SATURATION: 97 % | RESPIRATION RATE: 14 BRPM | WEIGHT: 174.6 LBS | SYSTOLIC BLOOD PRESSURE: 110 MMHG

## 2024-08-08 DIAGNOSIS — Z00.00 ROUTINE ADULT HEALTH MAINTENANCE: Primary | ICD-10-CM

## 2024-08-08 DIAGNOSIS — Z97.5 NEXPLANON IN PLACE: ICD-10-CM

## 2024-08-08 PROCEDURE — 99395 PREV VISIT EST AGE 18-39: CPT | Performed by: FAMILY MEDICINE

## 2024-08-08 SDOH — HEALTH STABILITY: PHYSICAL HEALTH: ON AVERAGE, HOW MANY DAYS PER WEEK DO YOU ENGAGE IN MODERATE TO STRENUOUS EXERCISE (LIKE A BRISK WALK)?: 3 DAYS

## 2024-08-08 SDOH — HEALTH STABILITY: PHYSICAL HEALTH: ON AVERAGE, HOW MANY MINUTES DO YOU ENGAGE IN EXERCISE AT THIS LEVEL?: 20 MIN

## 2024-08-08 ASSESSMENT — PATIENT HEALTH QUESTIONNAIRE - PHQ9
10. IF YOU CHECKED OFF ANY PROBLEMS, HOW DIFFICULT HAVE THESE PROBLEMS MADE IT FOR YOU TO DO YOUR WORK, TAKE CARE OF THINGS AT HOME, OR GET ALONG WITH OTHER PEOPLE: NOT DIFFICULT AT ALL
SUM OF ALL RESPONSES TO PHQ QUESTIONS 1-9: 2
SUM OF ALL RESPONSES TO PHQ QUESTIONS 1-9: 2

## 2024-08-08 ASSESSMENT — PAIN SCALES - GENERAL: PAINLEVEL: NO PAIN (0)

## 2024-08-08 ASSESSMENT — SOCIAL DETERMINANTS OF HEALTH (SDOH): HOW OFTEN DO YOU GET TOGETHER WITH FRIENDS OR RELATIVES?: TWICE A WEEK

## 2024-08-14 ENCOUNTER — OFFICE VISIT (OUTPATIENT)
Dept: FAMILY MEDICINE | Facility: CLINIC | Age: 24
End: 2024-08-14
Payer: COMMERCIAL

## 2024-08-14 ENCOUNTER — NURSE TRIAGE (OUTPATIENT)
Dept: FAMILY MEDICINE | Facility: CLINIC | Age: 24
End: 2024-08-14

## 2024-08-14 VITALS
WEIGHT: 172.5 LBS | OXYGEN SATURATION: 99 % | RESPIRATION RATE: 14 BRPM | TEMPERATURE: 98 F | SYSTOLIC BLOOD PRESSURE: 116 MMHG | BODY MASS INDEX: 28.74 KG/M2 | DIASTOLIC BLOOD PRESSURE: 70 MMHG | HEART RATE: 82 BPM | HEIGHT: 65 IN

## 2024-08-14 DIAGNOSIS — J02.0 RECURRENT STREPTOCOCCAL PHARYNGITIS: ICD-10-CM

## 2024-08-14 DIAGNOSIS — J02.9 SORE THROAT: ICD-10-CM

## 2024-08-14 DIAGNOSIS — J03.90 TONSILLITIS: Primary | ICD-10-CM

## 2024-08-14 LAB
BASOPHILS # BLD AUTO: 0 10E3/UL (ref 0–0.2)
BASOPHILS NFR BLD AUTO: 0 %
EOSINOPHIL # BLD AUTO: 0.1 10E3/UL (ref 0–0.7)
EOSINOPHIL NFR BLD AUTO: 1 %
ERYTHROCYTE [DISTWIDTH] IN BLOOD BY AUTOMATED COUNT: 14 % (ref 10–15)
HCT VFR BLD AUTO: 41.1 % (ref 35–47)
HGB BLD-MCNC: 13.4 G/DL (ref 11.7–15.7)
IMM GRANULOCYTES # BLD: 0 10E3/UL
IMM GRANULOCYTES NFR BLD: 0 %
LYMPHOCYTES # BLD AUTO: 3.3 10E3/UL (ref 0.8–5.3)
LYMPHOCYTES NFR BLD AUTO: 21 %
MCH RBC QN AUTO: 31.9 PG (ref 26.5–33)
MCHC RBC AUTO-ENTMCNC: 32.6 G/DL (ref 31.5–36.5)
MCV RBC AUTO: 98 FL (ref 78–100)
MONOCYTES # BLD AUTO: 1 10E3/UL (ref 0–1.3)
MONOCYTES NFR BLD AUTO: 6 %
MONOCYTES NFR BLD AUTO: NEGATIVE %
NEUTROPHILS # BLD AUTO: 11.3 10E3/UL (ref 1.6–8.3)
NEUTROPHILS NFR BLD AUTO: 72 %
PLATELET # BLD AUTO: 279 10E3/UL (ref 150–450)
RBC # BLD AUTO: 4.2 10E6/UL (ref 3.8–5.2)
WBC # BLD AUTO: 15.8 10E3/UL (ref 4–11)

## 2024-08-14 PROCEDURE — 86308 HETEROPHILE ANTIBODY SCREEN: CPT | Performed by: NURSE PRACTITIONER

## 2024-08-14 PROCEDURE — 36415 COLL VENOUS BLD VENIPUNCTURE: CPT | Performed by: NURSE PRACTITIONER

## 2024-08-14 PROCEDURE — G2211 COMPLEX E/M VISIT ADD ON: HCPCS | Performed by: NURSE PRACTITIONER

## 2024-08-14 PROCEDURE — 99213 OFFICE O/P EST LOW 20 MIN: CPT | Performed by: NURSE PRACTITIONER

## 2024-08-14 PROCEDURE — 85025 COMPLETE CBC W/AUTO DIFF WBC: CPT | Performed by: NURSE PRACTITIONER

## 2024-08-14 RX ORDER — PREDNISONE 20 MG/1
TABLET ORAL
Qty: 10 TABLET | Refills: 0 | Status: SHIPPED | OUTPATIENT
Start: 2024-08-14 | End: 2024-08-19

## 2024-08-14 RX ORDER — CEFPROZIL 500 MG/1
500 TABLET, FILM COATED ORAL 2 TIMES DAILY
Qty: 20 TABLET | Refills: 0 | Status: SHIPPED | OUTPATIENT
Start: 2024-08-14 | End: 2024-08-24

## 2024-08-14 RX ORDER — AZITHROMYCIN 250 MG/1
TABLET, FILM COATED ORAL
Qty: 6 TABLET | Refills: 0 | Status: CANCELLED | OUTPATIENT
Start: 2024-08-14 | End: 2024-08-19

## 2024-08-14 ASSESSMENT — ENCOUNTER SYMPTOMS
FATIGUE: 1
SORE THROAT: 1

## 2024-08-14 ASSESSMENT — PAIN SCALES - GENERAL: PAINLEVEL: SEVERE PAIN (6)

## 2024-08-14 NOTE — PROGRESS NOTES
"Raffaele Caldwell is a 23 year old, presenting for the following health issues:  URI (Sore throat and ear ache, [facility]. Strep negative. Asking for Antibiotic Treatment)      8/14/2024     3:34 PM   Additional Questions   Roomed by  VENUS STANFORD  This is a new problem. The current episode started yesterday. The problem occurs constantly. The problem has been gradually worsening. Associated symptoms include fatigue and a sore throat. The symptoms are aggravated by swallowing, eating and drinking. She has tried nothing for the symptoms. The treatment provided no relief.   History of Present Illness       Reason for visit:  Strep for 5th or 6th time in a year    She eats 0-1 servings of fruits and vegetables daily.She consumes 1 sweetened beverage(s) daily.She exercises with enough effort to increase her heart rate 10 to 19 minutes per day.  She exercises with enough effort to increase her heart rate 3 or less days per week.   She is taking medications regularly.       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  Physical Exam   GENERAL: alert and no distress  HENT: normal " cephalic/atraumatic, ear canals and TM's normal, nose and mouth without ulcers or lesions, oropharynx clear, oral mucous membranes moist, tonsillar hypertrophy, tonsillar erythema, and tonsillar exudate  NECK: bilateral anterior cervical adenopathy  RESP: lungs clear to auscultation - no rales, rhonchi or wheezes  CV: regular rate and rhythm, normal S1 S2, no S3 or S4, no murmur, click or rub, no peripheral edema   ABDOMEN: soft, nontender, no hepatosplenomegaly, no masses and bowel sounds normal    Results for orders placed or performed in visit on 08/14/24 (from the past 24 hour(s))   CBC with platelets and differential    Narrative    The following orders were created for panel order CBC with platelets and differential.  Procedure                               Abnormality         Status                     ---------                               -----------         ------                     CBC with platelets and d...[[id number]]  Abnormal            Final result                 Please view results for these tests on the individual orders.   Mononucleosis screen   Result Value Ref Range    Mononucleosis Screen Negative Negative   CBC with platelets and differential   Result Value Ref Range    WBC Count 15.8 (H) 4.0 - 11.0 10e3/uL    RBC Count 4.20 3.80 - 5.20 10e6/uL    Hemoglobin 13.4 11.7 - 15.7 g/dL    Hematocrit 41.1 35.0 - 47.0 %    MCV 98 78 - 100 fL    MCH 31.9 26.5 - 33.0 pg    MCHC 32.6 31.5 - 36.5 g/dL    RDW 14.0 10.0 - 15.0 %    Platelet Count 279 150 - 450 10e3/uL    % Neutrophils 72 %    % Lymphocytes 21 %    % Monocytes 6 %    % Eosinophils 1 %    % Basophils 0 %    % Immature Granulocytes 0 %    Absolute Neutrophils 11.3 (H) 1.6 - 8.3 10e3/uL    Absolute Lymphocytes 3.3 0.8 - 5.3 10e3/uL    Absolute Monocytes 1.0 0.0 - 1.3 10e3/uL    Absolute Eosinophils 0.1 0.0 - 0.7 10e3/uL    Absolute Basophils 0.0 0.0 - 0.2 10e3/uL    Absolute Immature Granulocytes 0.0 <=0.4 10e3/uL       A/P:  1.  Tonsillitis  - predniSONE (DELTASONE) 20 MG tablet; Take 2 tablets once a day x 5 days  Dispense: 10 tablet; Refill: 0  - cefPROZIL (CEFZIL) 500 MG tablet; Take 1 tablet (500 mg) by mouth 2 times daily for 10 days  Dispense: 20 tablet; Refill: 0    2. Sore throat  - CBC with platelets and differential; Future  - Mononucleosis screen; Future  - CBC with platelets and differential  - Mononucleosis screen    3. Recurrent streptococcal pharyngitis  - Adult ENT  Referral; Future      Signed Electronically by: Patricia Voss CNP

## 2024-08-14 NOTE — LETTER
August 14, 2024      Paulette Harvey  [address]  [facility] [address]        To Whom It May Concern:    Paulette Harvey  was seen on 8/14/24.  Please excuse her from work until symptoms resolved due to illness.  Estimate off work 8/14-8/16/24.        Sincerely,        Patricia Voss, CNP

## 2024-08-14 NOTE — TELEPHONE ENCOUNTER
Nurse Triage SBAR    Is this a 2nd Level Triage? NO    Situation: Patient calling to report sore throat and enlarged tonsils and is looking for advise, as she tested negative for strep.    Background: Was tested for strep at [facility] this morning and was negative. Reports she has had these symptoms 6-7 times this year. Has been treated with antibiotics even when negative in the past due to swollen and enflamed tonsils. She did not receive antibiotics from  this time.    Assessment: Patient reports sore throat started yesterday. Reports she can tell they are swollen, red and does notice white spots on tonsils. Reports mild runny nose. Denies any other symptoms at this time. Is able to eat and drink normally.     Protocol Recommended Disposition:   See in Office Today    Recommendation: Advised she needs further evaluation due to symptoms recurring and negative strep. Appointment scheduled.      Does the patient meet one of the following criteria for ADS visit consideration? 16+ years old, with an [facility] PCP     TIP  Providers, please consider if this condition is appropriate for management at one of our Acute and Diagnostic Services sites.     If patient is a good candidate, please use dotphrase <dot>triageresponse and select Refer to ADS to document.      Reason for Disposition   Earache also present    Additional Information   Negative: SEVERE difficulty breathing (e.g., struggling for each breath, speaks in single words)   Negative: Sounds like a life-threatening emergency to the triager   Negative: Throat culture results, call about   Negative: Productive cough is main symptom   Negative: Runny nose is main symptom   Negative: Drooling or spitting out saliva (because can't swallow)   Negative: Unable to open mouth completely   Negative: Drinking very little and has signs of dehydration (e.g., no urine > 12 hours, very dry mouth, very lightheaded)   Negative: Patient sounds very sick or weak to the triager    "Negative: Difficulty breathing (per caller) but not severe   Negative: Fever > 103 F (39.4 C)   Negative: Refuses to drink anything for > 12 hours   Negative: SEVERE sore throat pain   Negative: Pus on tonsils (back of throat) and swollen neck lymph nodes ('glands')    Answer Assessment - Initial Assessment Questions  1. ONSET: \"When did the throat start hurting?\" (Hours or days ago)       Last night  2. SEVERITY: \"How bad is the sore throat?\" (Scale 1-10; mild, moderate or severe)    - MILD (1-3):  Doesn't interfere with eating or normal activities.    - MODERATE (4-7): Interferes with eating some solids and normal activities.    - SEVERE (8-10):  Excruciating pain, interferes with most normal activities.    - SEVERE WITH DYSPHAGIA (10): Can't swallow liquids, drooling.      Mild, slowly getting worse  3. STREP EXPOSURE: \"Has there been any exposure to strep within the past week?\" If Yes, ask: \"What type of contact occurred?\"       No  4.  VIRAL SYMPTOMS: \"Are there any symptoms of a cold, such as a runny nose, cough, hoarse voice or red eyes?\"       Runny nose  5. FEVER: \"Do you have a fever?\" If Yes, ask: \"What is your temperature, how was it measured, and when did it start?\"      No  6. PUS ON THE TONSILS: \"Is there pus on the tonsils in the back of your throat?\"      White spots on tonsils  7. OTHER SYMPTOMS: \"Do you have any other symptoms?\" (e.g., difficulty breathing, headache, rash)      No  8. PREGNANCY: \"Is there any chance you are pregnant?\" \"When was your last menstrual period?\"      No    Protocols used: Sore Throat-A-OH    Kathi Dye RN  [facility]    "

## 2024-09-03 ENCOUNTER — VIRTUAL VISIT (OUTPATIENT)
Dept: FAMILY MEDICINE | Facility: CLINIC | Age: 24
End: 2024-09-03
Payer: COMMERCIAL

## 2024-09-03 DIAGNOSIS — R30.0 DIFFICULT OR PAINFUL URINATION: Primary | ICD-10-CM

## 2024-09-03 PROCEDURE — G2211 COMPLEX E/M VISIT ADD ON: HCPCS | Mod: 95 | Performed by: FAMILY MEDICINE

## 2024-09-03 PROCEDURE — 99214 OFFICE O/P EST MOD 30 MIN: CPT | Mod: 95 | Performed by: FAMILY MEDICINE

## 2024-09-03 RX ORDER — SULFAMETHOXAZOLE/TRIMETHOPRIM 800-160 MG
1 TABLET ORAL 2 TIMES DAILY
Qty: 20 TABLET | Refills: 0 | Status: SHIPPED | OUTPATIENT
Start: 2024-09-03 | End: 2024-09-06 | Stop reason: ALTCHOICE

## 2024-09-03 NOTE — PROGRESS NOTES
"Paulette is a 23 year old who is being evaluated via a billable video visit.    How would you like to obtain your AVS? MyChart  If the video visit is dropped, the invitation should be resent by: Text to cell phone: [phone]  Will anyone else be joining your video visit? No      Assessment & Plan     Difficult or painful urination  Patient with recent UTI treated with bactrim. Improved with the 3 days of treatment.   Now symptoms returned.   Mild cramping, burning with urination and frequency.   Appears well on video.   Recommend checking labs as noted.   Restart bactrim for now and await culture results.   Recheck sooner if concerns such as fever, abdominal pain, vomiting, nausea or worsening back pain.   - UA Macroscopic with reflex to Microscopic and Culture; Future  - Chlamydia trachomatis PCR; Future  - Neisseria gonorrhoeae PCR; Future  - sulfamethoxazole-trimethoprim (BACTRIM DS) 800-160 MG tablet; Take 1 tablet by mouth 2 times daily for 10 days.    The longitudinal plan of care for the diagnosis(es)/condition(s) as documented were addressed during this visit. Due to the added complexity in care, I will continue to support Paulette in the subsequent management and with ongoing continuity of care.      MED REC REQUIRED  Post Medication Reconciliation Status:   BMI  Estimated body mass index is 28.71 kg/m  as calculated from the following:    Height as of 8/14/24: 1.651 m (5' 5\").    Weight as of 8/14/24: 78.2 kg (172 lb 8 oz).   Weight management plan: Patient was referred to their PCP to discuss a diet and exercise plan.          Subjective   Paulette is a 23 year old, presenting for the following health issues:  UTI        9/3/2024     2:35 PM   Additional Questions   Roomed by Sherman BAZZI   Accompanied by Self         9/3/2024     2:35 PM   Patient Reported Additional Medications   Patient reports taking the following new medications None     UTI         Genitourinary - Female  Onset/Duration: Urinary symptoms " started about 1-1/2 weeks ago, started antibiotics on 8/27. Once medications ran out she started having symptoms again.   Description:   Painful urination (Dysuria): YES           Frequency: YES  Blood in urine (Hematuria): No  Delay in urine (Hesitency): No  Intensity: moderate but almost severe  Progression of Symptoms:  worsening  Accompanying Signs & Symptoms:  Fever/chills: No  Flank pain: YES- Bilateral low back pain  Nausea and vomiting: No  Vaginal symptoms: none  Abdominal/Pelvic Pain: YES- Cramping  History:   History of frequent UTI s: YES  History of kidney stones: No  Sexually Active: YES  Possibility of pregnancy: Yes  Precipitating or alleviating factors: None  Therapies tried and outcome: Antibiotics, tylenol    Symptoms like a UTI started about 10 days ago or so.    On 8/27, had an online visit for UTI. Started on Bactrim for 3-4 days.   Has taken that before.   It did help, but symptoms have returned.   Having some shooting pain and cramping low pelvis.                 Objective           Vitals:  No vitals were obtained today due to virtual visit.    Physical Exam   GENERAL: alert and no distress  EYES: Eyes grossly normal to inspection.  No discharge or erythema, or obvious scleral/conjunctival abnormalities.  RESP: No audible wheeze, cough, or visible cyanosis.    SKIN: Visible skin clear. No significant rash, abnormal pigmentation or lesions.  NEURO: Cranial nerves grossly intact.  Mentation and speech appropriate for age.  PSYCH: Appropriate affect, tone, and pace of words          Video-Visit Details    Type of service:  Video Visit   Originating Location (pt. Location): Other car. parked    Distant Location (provider location):  On-site  Platform used for Video Visit: Gunner  Signed Electronically by: Tigist Mojica MD

## 2024-09-04 ENCOUNTER — LAB (OUTPATIENT)
Dept: LAB | Facility: CLINIC | Age: 24
End: 2024-09-04
Payer: COMMERCIAL

## 2024-09-04 DIAGNOSIS — R30.0 DIFFICULT OR PAINFUL URINATION: ICD-10-CM

## 2024-09-04 LAB
ALBUMIN UR-MCNC: NEGATIVE MG/DL
APPEARANCE UR: CLEAR
BACTERIA #/AREA URNS HPF: ABNORMAL /HPF
BILIRUB UR QL STRIP: NEGATIVE
COLOR UR AUTO: YELLOW
GLUCOSE UR STRIP-MCNC: NEGATIVE MG/DL
HGB UR QL STRIP: ABNORMAL
KETONES UR STRIP-MCNC: NEGATIVE MG/DL
LEUKOCYTE ESTERASE UR QL STRIP: ABNORMAL
NITRATE UR QL: NEGATIVE
PH UR STRIP: 6.5 [PH] (ref 5–8)
RBC #/AREA URNS AUTO: ABNORMAL /HPF
SP GR UR STRIP: 1.01 (ref 1–1.03)
SQUAMOUS #/AREA URNS AUTO: ABNORMAL /LPF
UROBILINOGEN UR STRIP-ACNC: 0.2 E.U./DL
WBC #/AREA URNS AUTO: ABNORMAL /HPF
WBC CLUMPS #/AREA URNS HPF: PRESENT /HPF

## 2024-09-04 PROCEDURE — 87591 N.GONORRHOEAE DNA AMP PROB: CPT

## 2024-09-04 PROCEDURE — 87186 SC STD MICRODIL/AGAR DIL: CPT

## 2024-09-04 PROCEDURE — 87491 CHLMYD TRACH DNA AMP PROBE: CPT

## 2024-09-04 PROCEDURE — 87086 URINE CULTURE/COLONY COUNT: CPT

## 2024-09-04 PROCEDURE — 81001 URINALYSIS AUTO W/SCOPE: CPT

## 2024-09-05 ENCOUNTER — OFFICE VISIT (OUTPATIENT)
Dept: FAMILY MEDICINE | Facility: CLINIC | Age: 24
End: 2024-09-05
Payer: COMMERCIAL

## 2024-09-05 VITALS
HEART RATE: 75 BPM | OXYGEN SATURATION: 99 % | HEIGHT: 65 IN | DIASTOLIC BLOOD PRESSURE: 80 MMHG | TEMPERATURE: 97.9 F | SYSTOLIC BLOOD PRESSURE: 130 MMHG | BODY MASS INDEX: 28.86 KG/M2 | WEIGHT: 173.2 LBS | RESPIRATION RATE: 16 BRPM

## 2024-09-05 DIAGNOSIS — Z30.46 ENCOUNTER FOR REMOVAL AND REINSERTION OF NEXPLANON: Primary | ICD-10-CM

## 2024-09-05 LAB
BACTERIA UR CULT: ABNORMAL
C TRACH DNA SPEC QL NAA+PROBE: NEGATIVE
N GONORRHOEA DNA SPEC QL NAA+PROBE: NEGATIVE

## 2024-09-05 PROCEDURE — 11983 REMOVE/INSERT DRUG IMPLANT: CPT

## 2024-09-05 ASSESSMENT — PAIN SCALES - GENERAL: PAINLEVEL: NO PAIN (0)

## 2024-09-05 NOTE — PROGRESS NOTES
"  Assessment & Plan     Encounter for removal and reinsertion of Nexplanon  The patient tolerated the procedure well without  complications. Standard post-procedure care is explained and return  precautions are given.    - etonogestrel (NEXPLANON) subdermal implant 68 mg  - REMOVE & REINSERT NON-BIODEGRADABLE DRUG DELIVERY IMPLANT      Raffaele Caldwell is a 23 year old, presenting for the following health issues:  nexplanon (Removal and replace from L arm. )        9/5/2024     8:16 AM   Additional Questions   Roomed by Priti TAY LPN     HPI     Patient presents for nexplanon removal and replacement. Originally placed back in June 2021.       Review of Systems  Constitutional, HEENT, cardiovascular, pulmonary, gi and gu systems are negative, except as otherwise noted.      Objective    /80 (BP Location: Left arm, Patient Position: Sitting, Cuff Size: Adult Regular)   Pulse 75   Temp 97.9  F (36.6  C) (Oral)   Resp 16   Ht 1.651 m (5' 5\")   Wt 78.6 kg (173 lb 3.2 oz)   LMP 08/08/2024 (Approximate)   SpO2 99%   Breastfeeding No   BMI 28.82 kg/m    Body mass index is 28.82 kg/m .  Physical Exam   GENERAL: alert and no distress  EYES: Eyes grossly normal to inspection, conjunctivae and sclerae normal  NECK: no visualized asymmetry, masses, or scars  RESP: normal respiratory effort  SKIN: no suspicious lesions or rashes on visualized skin. Nexplanon palpated in LUE prior to removal and reinsertion   PSYCH: mentation appears normal, affect normal/bright    PROCEDURE: Nexplanon Removal and Insertion  Anesthesia: 1% Lidocaine w/ epinephrine 3 ml   Procedure: Consent obtained. A time-out was performed prior to  initiating procedure to be sure of right patient and right location. The  area surrounding the Nexplanon was prepared with betadine swab x3 and draped  in the usual sterile manner. The site was anesthetized with lidocaine.  A skin incision was made over the distal aspect of the device. The  capsule " lysed sharply and the device removed using a hemostat. The length of the nexplanon was confirmed to be 4cm. Then attention was turned to inserting the new nexplanon implant. Nexplanon  trocar was inserted subcutaneously and then Nexplanon  capsule delivered subcutaneously. Trocar was removed from the insertion site. Nexplanon  capsule was palpated by provider to assure satisfactory placement  - the patient declined to palpate the capsule. Estimated blood loss of 0 mLHemostasis was assured. The site was dressed with  SteriStrips and a pressure dressing. The patient tolerated the procedure well.     Followup: The patient tolerated the procedure well without  complications.  Standard post-procedure care is explained and return  precautions are given.         Signed Electronically by: Pauly Alvarado PA-C

## 2024-09-06 ENCOUNTER — TELEPHONE (OUTPATIENT)
Dept: FAMILY MEDICINE | Facility: CLINIC | Age: 24
End: 2024-09-06
Payer: COMMERCIAL

## 2024-09-06 DIAGNOSIS — N30.00 ACUTE CYSTITIS WITHOUT HEMATURIA: Primary | ICD-10-CM

## 2024-09-06 RX ORDER — CIPROFLOXACIN 500 MG/1
500 TABLET, FILM COATED ORAL 2 TIMES DAILY
Qty: 14 TABLET | Refills: 0 | Status: SHIPPED | OUTPATIENT
Start: 2024-09-06 | End: 2024-09-13

## 2024-09-09 ENCOUNTER — TELEPHONE (OUTPATIENT)
Dept: FAMILY MEDICINE | Facility: CLINIC | Age: 24
End: 2024-09-09
Payer: COMMERCIAL

## 2024-09-09 NOTE — TELEPHONE ENCOUNTER
RN spoke with patient and she had read message. Patient verbalized understanding and all questions answered.     Hello -     Here are my comments about your recent results:     -Urine culture is abnormal and grew out bacteria that are NOT sensitive to the antibiotic you have been given.   I have sent a new antibiotic to your pharmacy to replace the previous antibiotic.  Your urine should be retested after treatment with a lab appointment about 2 weeks after finishing the antibiotic.     For additional lab test information, labtestsonline.org is an excellent reference..     Please let us know if you have any questions or concerns.     Regards,  MD Nesha Xavier RN  [facility] - Registered Nurse  Clinic Triage [place]   September 9, 2024

## 2024-09-13 ENCOUNTER — TRANSFERRED RECORDS (OUTPATIENT)
Dept: HEALTH INFORMATION MANAGEMENT | Facility: CLINIC | Age: 24
End: 2024-09-13
Payer: COMMERCIAL

## 2024-10-11 ENCOUNTER — OFFICE VISIT (OUTPATIENT)
Dept: FAMILY MEDICINE | Facility: CLINIC | Age: 24
End: 2024-10-11
Payer: COMMERCIAL

## 2024-10-11 VITALS
BODY MASS INDEX: 29.85 KG/M2 | HEART RATE: 75 BPM | OXYGEN SATURATION: 100 % | TEMPERATURE: 97.5 F | RESPIRATION RATE: 18 BRPM | HEIGHT: 65 IN | DIASTOLIC BLOOD PRESSURE: 74 MMHG | WEIGHT: 179.2 LBS | SYSTOLIC BLOOD PRESSURE: 124 MMHG

## 2024-10-11 DIAGNOSIS — Z01.818 PRE-OP EXAMINATION: Primary | ICD-10-CM

## 2024-10-11 DIAGNOSIS — J02.0 RECURRENT STREPTOCOCCAL PHARYNGITIS: ICD-10-CM

## 2024-10-11 DIAGNOSIS — R11.0 NAUSEA: ICD-10-CM

## 2024-10-11 LAB
ERYTHROCYTE [DISTWIDTH] IN BLOOD BY AUTOMATED COUNT: 13.3 % (ref 10–15)
HCG UR QL: NEGATIVE
HCT VFR BLD AUTO: 42.5 % (ref 35–47)
HGB BLD-MCNC: 14.1 G/DL (ref 11.7–15.7)
MCH RBC QN AUTO: 32.7 PG (ref 26.5–33)
MCHC RBC AUTO-ENTMCNC: 33.2 G/DL (ref 31.5–36.5)
MCV RBC AUTO: 99 FL (ref 78–100)
PLATELET # BLD AUTO: 299 10E3/UL (ref 150–450)
RBC # BLD AUTO: 4.31 10E6/UL (ref 3.8–5.2)
WBC # BLD AUTO: 11.1 10E3/UL (ref 4–11)

## 2024-10-11 PROCEDURE — 36415 COLL VENOUS BLD VENIPUNCTURE: CPT

## 2024-10-11 PROCEDURE — 99213 OFFICE O/P EST LOW 20 MIN: CPT

## 2024-10-11 PROCEDURE — 81025 URINE PREGNANCY TEST: CPT

## 2024-10-11 PROCEDURE — 85027 COMPLETE CBC AUTOMATED: CPT

## 2024-10-11 RX ORDER — ONDANSETRON 4 MG/1
4 TABLET, FILM COATED ORAL EVERY 6 HOURS PRN
Qty: 20 TABLET | Refills: 0 | Status: SHIPPED | OUTPATIENT
Start: 2024-10-11

## 2024-10-11 ASSESSMENT — PAIN SCALES - GENERAL: PAINLEVEL: NO PAIN (0)

## 2024-10-11 NOTE — PROGRESS NOTES
Preoperative Evaluation  [facility]  [address]  [facility] [address]  Phone: [phone]  Fax: [phone]  Primary Provider: Rebecca Cardenas MD  Pre-op Performing Provider: BRYSON De Santiago CNP  Oct 11, 2024             10/10/2024   Surgical Information   What procedure is being done? Tonsillectomy   Facility or Hospital where procedure/surgery will be performed: [facility]   Who is doing the procedure / surgery? Dr. Alegria   Date of surgery / procedure: 10/18/24   Time of surgery / procedure: Unknown right now   Where do you plan to recover after surgery? at home with family      Fax number for surgical facility: [phone]    Assessment & Plan     The proposed surgical procedure is considered INTERMEDIATE risk.    Pre-op examination  Scheduled 10/18/2024 for adenotonsillectomy with Dr. Chepe Alegria at [facility].  No prior surgeries- unknown complications with sedation or anesthesia.    - HCG qualitative urine  - CBC with platelets    Recurrent streptococcal pharyngitis  History of recurrent strep since summer 2023, totally 6 cases of strep.      Nausea  Intermittent nausea.  Needs refill.   - ondansetron (ZOFRAN) 4 MG tablet; Take 1 tablet (4 mg) by mouth every 6 hours as needed for nausea or vomiting.    Possible Sleep Apnea: never had sleep study        - No identified additional risk factors other than previously addressed    Antiplatelet or Anticoagulation Medication Instructions   - Patient is on no antiplatelet or anticoagulation medications.    Additional Medication Instructions  Take all scheduled medications on the day of surgery    Recommendation  Approval given to proceed with proposed procedure, without further diagnostic evaluation.    Raffaele Caldwell is a 24 year old, presenting for the following:  Pre-Op Exam        10/11/2024     8:13 AM   Additional Questions   Roomed by Ana BALDERRAMA CMA     HPI related to upcoming procedure:      Patient is a 24-year old female presenting for pre operative exam.  No prior surgeries- unknown complications with sedation or anesthesia.  Allergies include macrobid and clavulanic acid.  Scheduled 10/18/2024 for adenotonsillectomy with Dr. Chepe Alegria at [facility].  History of recurrent strep since summer 2023, totally 6 cases of strep.  Takes zofran as needed for intermittent nausea, usually related to anxiety.         10/10/2024   Pre-Op Questionnaire   Have you ever had a heart attack or stroke? No   Have you ever had surgery on your heart or blood vessels, such as a stent placement, a coronary artery bypass, or surgery on an artery in your head, neck, heart, or legs? No   Do you have chest pain with activity? No   Do you have a history of heart failure? No   Do you currently have a cold, bronchitis or symptoms of other infection? No   Do you have a cough, shortness of breath, or wheezing? No   Do you or anyone in your family have previous history of blood clots? No   Do you or does anyone in your family have a serious bleeding problem such as prolonged bleeding following surgeries or cuts? No   Have you ever had problems with anemia or been told to take iron pills? No   Have you had any abnormal blood loss such as black, tarry or bloody stools, or abnormal vaginal bleeding? No   Have you ever had a blood transfusion? No   Are you willing to have a blood transfusion if it is medically needed before, during, or after your surgery? Yes   Have you or any of your relatives ever had problems with anesthesia? No   Do you have sleep apnea, excessive snoring or daytime drowsiness? (!) YES, snoring, and insomnia   Do you have a CPAP machine? (!) NO, never had sleep study.    Do you have any artifical heart valves or other implanted medical devices like a pacemaker, defibrillator, or continuous glucose monitor? No   Do you have artificial joints? No   Are you allergic to latex? No      Health Care  "Directive  Patient does not have a Health Care Directive or Living Will: Discussed advance care planning with patient; however, patient declined at this time.    Preoperative Review of    reviewed - no record of controlled substances prescribed.      Patient Active Problem List    Diagnosis Date Noted    Major depression, recurrent (H) 07/13/2023     Priority: Medium    Nexplanon in place 06/17/2021     Priority: Medium    Generalized anxiety disorder 09/14/2020     Priority: Medium      Past Medical History:   Diagnosis Date    Strep throat      Past Surgical History:   Procedure Laterality Date    NO HISTORY OF SURGERY       Current Outpatient Medications   Medication Sig Dispense Refill    ondansetron (ZOFRAN) 4 MG tablet Take 1 tablet (4 mg) by mouth every 6 hours as needed for nausea or vomiting 20 tablet 0     Allergies   Allergen Reactions    Macrobid [Nitrofurantoin] Nausea and Vomiting     Antibiotic gastritis with recurrent vomiting.    Clavulanic Acid      See allergy note 12/23      Social History     Tobacco Use    Smoking status: Never     Passive exposure: Current    Smokeless tobacco: Never    Tobacco comments:     vapes nicotine   Substance Use Topics    Alcohol use: Yes     Family History   Problem Relation Age of Onset    No Known Problems Mother     No Known Problems Father     Diabetes Maternal Grandmother     Tremor Maternal Grandfather     No Known Problems Paternal Grandmother     No Known Problems Paternal Grandfather      History   Drug Use    Types: Marijuana     Review of Systems  Review of systems negative otherwise known HPI.    Objective    /74 (BP Location: Left arm, Patient Position: Sitting, Cuff Size: Adult Regular)   Pulse 75   Temp 97.5  F (36.4  C) (Temporal)   Resp 18   Ht 1.651 m (5' 5\")   Wt 81.3 kg (179 lb 3.2 oz)   LMP 08/08/2024 (Approximate)   SpO2 100%   BMI 29.82 kg/m     Estimated body mass index is 29.82 kg/m  as calculated from the following:    " "Height as of this encounter: 1.651 m (5' 5\").    Weight as of this encounter: 81.3 kg (179 lb 3.2 oz).  Physical Exam  General: Alert, oriented, no acute distress.    Head: Normocephalic and atraumatic.   Eyes: Conjunctiva and sclera clear. ZEE.   Ears: External ear nontender. TMs intact and clear. Grossly normal hearing.   Oropharynx: Dentition intact. Oral and posterior pharynx pink and moist. +1 bilateral tonsil hypertrophy.   Neck: Supple, no masses or nodes. No adenopathy.    Respiratory: Lungs clear, unlabored. No rales, rhonchi, or wheezes.    Cardiovascular: Regular rate and rhythm, S1 and S2. No murmurs. No peripheral edema.     Gastrointestinal: Normoactive bowel sounds. Soft, nontender, no organomegaly.     Musculoskeletal: No joint tenderness, deformity, or swelling.     Skin: No suspicious lesions, rashes, or abnormalities.    Neurologic: No gross motor or sensory deficit. Mentation intact and speech normal.     Psychiatric: Appropriate affect.     Recent Labs   Lab Test 08/14/24  1603   HGB 13.4         Diagnostics  Labs pending at this time.  Results will be reviewed when available.   No EKG required, no history of coronary heart disease, significant arrhythmia, peripheral arterial disease or other structural heart disease.    Revised Cardiac Risk Index (RCRI)  The patient has the following serious cardiovascular risks for perioperative complications:   - No serious cardiac risks = 0 points     RCRI Interpretation: 0 points: Class I (very low risk - 0.4% complication rate)    Signed Electronically by: BRYSON De Santiago CNP  A copy of this evaluation report is provided to the requesting physician.        "

## 2025-01-13 ENCOUNTER — HOSPITAL ENCOUNTER (EMERGENCY)
Facility: CLINIC | Age: 25
Discharge: HOME | End: 2025-01-13
Attending: EMERGENCY MEDICINE | Admitting: EMERGENCY MEDICINE
Payer: COMMERCIAL

## 2025-01-13 ENCOUNTER — APPOINTMENT (OUTPATIENT)
Dept: CT IMAGING | Facility: CLINIC | Age: 25
End: 2025-01-13
Attending: EMERGENCY MEDICINE
Payer: COMMERCIAL

## 2025-01-13 VITALS
BODY MASS INDEX: 29.99 KG/M2 | DIASTOLIC BLOOD PRESSURE: 75 MMHG | HEIGHT: 65 IN | WEIGHT: 180 LBS | SYSTOLIC BLOOD PRESSURE: 101 MMHG | TEMPERATURE: 98.4 F | OXYGEN SATURATION: 99 % | RESPIRATION RATE: 20 BRPM | HEART RATE: 114 BPM

## 2025-01-13 DIAGNOSIS — R51.9 HEADACHE, UNSPECIFIED HEADACHE TYPE: ICD-10-CM

## 2025-01-13 DIAGNOSIS — R11.2 NAUSEA AND VOMITING, UNSPECIFIED VOMITING TYPE: ICD-10-CM

## 2025-01-13 LAB
ALBUMIN SERPL BCG-MCNC: 4.6 G/DL (ref 3.5–5.2)
ALP SERPL-CCNC: 46 U/L (ref 40–150)
ALT SERPL W P-5'-P-CCNC: 36 U/L (ref 0–50)
ANION GAP SERPL CALCULATED.3IONS-SCNC: 16 MMOL/L (ref 7–15)
AST SERPL W P-5'-P-CCNC: 29 U/L (ref 0–45)
BASOPHILS # BLD AUTO: 0.1 10E3/UL (ref 0–0.2)
BASOPHILS NFR BLD AUTO: 1 %
BILIRUB SERPL-MCNC: 0.4 MG/DL
BUN SERPL-MCNC: 10.1 MG/DL (ref 6–20)
CALCIUM SERPL-MCNC: 9.1 MG/DL (ref 8.8–10.4)
CHLORIDE SERPL-SCNC: 104 MMOL/L (ref 98–107)
CREAT SERPL-MCNC: 0.67 MG/DL (ref 0.51–0.95)
EGFRCR SERPLBLD CKD-EPI 2021: >90 ML/MIN/1.73M2
EOSINOPHIL # BLD AUTO: 0.1 10E3/UL (ref 0–0.7)
EOSINOPHIL NFR BLD AUTO: 1 %
ERYTHROCYTE [DISTWIDTH] IN BLOOD BY AUTOMATED COUNT: 12.6 % (ref 10–15)
GASTROCULT GAST QL: POSITIVE
GLUCOSE SERPL-MCNC: 104 MG/DL (ref 70–99)
HCO3 SERPL-SCNC: 20 MMOL/L (ref 22–29)
HCT VFR BLD AUTO: 40.2 % (ref 35–47)
HGB BLD-MCNC: 14 G/DL (ref 11.7–15.7)
IMM GRANULOCYTES # BLD: 0 10E3/UL
IMM GRANULOCYTES NFR BLD: 0 %
INR PPP: 0.99 (ref 0.85–1.15)
LYMPHOCYTES # BLD AUTO: 3.2 10E3/UL (ref 0.8–5.3)
LYMPHOCYTES NFR BLD AUTO: 33 %
MCH RBC QN AUTO: 32.6 PG (ref 26.5–33)
MCHC RBC AUTO-ENTMCNC: 34.8 G/DL (ref 31.5–36.5)
MCV RBC AUTO: 94 FL (ref 78–100)
MONOCYTES # BLD AUTO: 0.7 10E3/UL (ref 0–1.3)
MONOCYTES NFR BLD AUTO: 7 %
NEUTROPHILS # BLD AUTO: 5.5 10E3/UL (ref 1.6–8.3)
NEUTROPHILS NFR BLD AUTO: 58 %
NRBC # BLD AUTO: 0 10E3/UL
NRBC BLD AUTO-RTO: 0 /100
PLATELET # BLD AUTO: 326 10E3/UL (ref 150–450)
POTASSIUM SERPL-SCNC: 3.7 MMOL/L (ref 3.4–5.3)
PROT SERPL-MCNC: 7.1 G/DL (ref 6.4–8.3)
RBC # BLD AUTO: 4.29 10E6/UL (ref 3.8–5.2)
SODIUM SERPL-SCNC: 140 MMOL/L (ref 135–145)
WBC # BLD AUTO: 9.5 10E3/UL (ref 4–11)

## 2025-01-13 PROCEDURE — 258N000003 HC RX IP 258 OP 636: Performed by: EMERGENCY MEDICINE

## 2025-01-13 PROCEDURE — 250N000011 HC RX IP 250 OP 636: Performed by: EMERGENCY MEDICINE

## 2025-01-13 PROCEDURE — 80053 COMPREHEN METABOLIC PANEL: CPT | Performed by: EMERGENCY MEDICINE

## 2025-01-13 PROCEDURE — 250N000009 HC RX 250: Performed by: EMERGENCY MEDICINE

## 2025-01-13 PROCEDURE — 96374 THER/PROPH/DIAG INJ IV PUSH: CPT

## 2025-01-13 PROCEDURE — 96361 HYDRATE IV INFUSION ADD-ON: CPT

## 2025-01-13 PROCEDURE — 85025 COMPLETE CBC W/AUTO DIFF WBC: CPT | Performed by: EMERGENCY MEDICINE

## 2025-01-13 PROCEDURE — 99285 EMERGENCY DEPT VISIT HI MDM: CPT | Mod: 25

## 2025-01-13 PROCEDURE — 85014 HEMATOCRIT: CPT | Performed by: EMERGENCY MEDICINE

## 2025-01-13 PROCEDURE — 85610 PROTHROMBIN TIME: CPT | Performed by: EMERGENCY MEDICINE

## 2025-01-13 PROCEDURE — 70450 CT HEAD/BRAIN W/O DYE: CPT

## 2025-01-13 PROCEDURE — 96375 TX/PRO/DX INJ NEW DRUG ADDON: CPT

## 2025-01-13 PROCEDURE — 82271 OCCULT BLOOD OTHER SOURCES: CPT | Performed by: EMERGENCY MEDICINE

## 2025-01-13 PROCEDURE — 36415 COLL VENOUS BLD VENIPUNCTURE: CPT | Performed by: EMERGENCY MEDICINE

## 2025-01-13 RX ORDER — ONDANSETRON 4 MG/1
4-8 TABLET, ORALLY DISINTEGRATING ORAL EVERY 8 HOURS PRN
Qty: 10 TABLET | Refills: 0 | Status: SHIPPED | OUTPATIENT
Start: 2025-01-13 | End: 2025-01-16

## 2025-01-13 RX ORDER — PROCHLORPERAZINE MALEATE 10 MG
10 TABLET ORAL EVERY 6 HOURS PRN
Qty: 20 TABLET | Refills: 0 | Status: SHIPPED | OUTPATIENT
Start: 2025-01-13

## 2025-01-13 RX ORDER — PANTOPRAZOLE SODIUM 40 MG/1
40 TABLET, DELAYED RELEASE ORAL DAILY
Qty: 30 TABLET | Refills: 0 | Status: SHIPPED | OUTPATIENT
Start: 2025-01-13 | End: 2025-02-12

## 2025-01-13 RX ORDER — DIMENHYDRINATE 50 MG/ML
50 INJECTION, SOLUTION INTRAMUSCULAR; INTRAVENOUS ONCE
Status: COMPLETED | OUTPATIENT
Start: 2025-01-13 | End: 2025-01-13

## 2025-01-13 RX ORDER — ONDANSETRON 2 MG/ML
4 INJECTION INTRAMUSCULAR; INTRAVENOUS ONCE
Status: COMPLETED | OUTPATIENT
Start: 2025-01-13 | End: 2025-01-13

## 2025-01-13 RX ADMIN — ONDANSETRON 4 MG: 2 INJECTION, SOLUTION INTRAMUSCULAR; INTRAVENOUS at 00:55

## 2025-01-13 RX ADMIN — SODIUM CHLORIDE 1000 ML: 9 INJECTION, SOLUTION INTRAVENOUS at 01:42

## 2025-01-13 RX ADMIN — SODIUM CHLORIDE 1000 ML: 9 INJECTION, SOLUTION INTRAVENOUS at 00:55

## 2025-01-13 RX ADMIN — PANTOPRAZOLE SODIUM 40 MG: 40 INJECTION, POWDER, FOR SOLUTION INTRAVENOUS at 02:06

## 2025-01-13 RX ADMIN — PROCHLORPERAZINE EDISYLATE 10 MG: 5 INJECTION INTRAMUSCULAR; INTRAVENOUS at 01:42

## 2025-01-13 RX ADMIN — DIMENHYDRINATE 50 MG: 50 INJECTION, SOLUTION INTRAMUSCULAR; INTRAVENOUS at 01:15

## 2025-01-13 ASSESSMENT — COLUMBIA-SUICIDE SEVERITY RATING SCALE - C-SSRS
6. HAVE YOU EVER DONE ANYTHING, STARTED TO DO ANYTHING, OR PREPARED TO DO ANYTHING TO END YOUR LIFE?: NO
2. HAVE YOU ACTUALLY HAD ANY THOUGHTS OF KILLING YOURSELF IN THE PAST MONTH?: NO
1. IN THE PAST MONTH, HAVE YOU WISHED YOU WERE DEAD OR WISHED YOU COULD GO TO SLEEP AND NOT WAKE UP?: NO

## 2025-01-13 ASSESSMENT — ACTIVITIES OF DAILY LIVING (ADL)
ADLS_ACUITY_SCORE: 41
ADLS_ACUITY_SCORE: 41

## 2025-01-13 NOTE — ED TRIAGE NOTES
Pt is coming in tonight after two vomiting episodes that happed around 2300. PT states that the vomited was dark red in color despite not eating or drinking anything red/dark purple or dark blue in color.     PT denies any ABD pain, bleeding issues or ulcers.      Triage Assessment (Adult)       Row Name 01/13/25 0016          Triage Assessment    Airway WDL WDL        Respiratory WDL    Respiratory WDL WDL        Skin Circulation/Temperature WDL    Skin Circulation/Temperature WDL WDL        Cardiac WDL    Cardiac WDL WDL        Peripheral/Neurovascular WDL    Peripheral Neurovascular WDL WDL        Cognitive/Neuro/Behavioral WDL    Cognitive/Neuro/Behavioral WDL WDL

## 2025-01-13 NOTE — Clinical Note
Paulette Harvey was seen and treated in our emergency department on 1/13/2025.  She may return to work on 01/14/2025.  Unable to work due to illness     If you have any questions or concerns, please don't hesitate to call.      Lakeshia Angulo MD

## 2025-01-13 NOTE — ED PROVIDER NOTES
EMERGENCY DEPARTMENT ENCOUnter      NAME: Paulette Harvey  AGE: 24 year old female  YOB: 2000  MRN: [id number]  EVALUATION DATE & TIME: 1/13/2025 12:22 AM    PCP: Rebecca Cardenas    ED PROVIDER: Lakeshia Angulo MD      Chief Complaint   Patient presents with    Vomiting         FINAL IMPRESSION:  1. Nausea and vomiting, unspecified vomiting type    2. Headache, unspecified headache type          ED COURSE & MEDICAL DECISION MAKING:      In summary, the patient is a 24-year-old female that presents to the emergency department for evaluation of nausea and vomiting after a day of drinking alcohol.  The patient was concerned because she thought she had blood in her emesis.  Patient is Gastroccult positive and I suspect she may have a Alexia-Vazquez tear or gastritis.  Her vital signs and labs are reassuring.  She also complained of a left frontal headache.she CT head demonstrated no acute abnormalities.  We will treat symptomatically as an outpatient with close outpatient follow-up.  12:25 AM I met with the patient, obtained history, performed an initial exam, and discussed options and plan for diagnostics and treatment here in the ED. Zofran 4 mg IV was administered for nausea and vomiting.  Normal saline 1 L IV was administered for IV hydration.  Dramamine 50 mg IV was administered for further control of her nausea and vomiting.  1:34 AM I updated patient.  Patient continues to have emesis.  Compazine 10 mg IV was administered.  An additional liter of normal saline was administered.  2:15 AM We discussed the plan for discharge and the patient is agreeable. Reviewed supportive cares, symptomatic treatment, outpatient follow up, and reasons to return to the Emergency Department. Patient to be discharged by ED RN.       Medical Decision Making  Obtained supplemental history:Supplemental history obtained?: No  Reviewed external records: External records reviewed?: No  Care impacted by chronic  illness:Documented in Chart  Did you consider but not order tests?: Work up considered but not performed and documented in chart, if applicable  Did you interpret images independently?: Independent interpretation of ECG and images noted in documentation, when applicable.  Consultation discussion with other provider:Did you involve another provider (consultant, , pharmacy, etc.)?: No  Discharge. I prescribed additional prescription strength medication(s) as charted. I considered admission, but discharged patient after significant clinical improvement.    MIPS: Not Applicable        At the conclusion of the encounter I discussed the results of all of the tests and the disposition. The questions were answered. The patient or family acknowledged understanding and was agreeable with the care plan.         MEDICATIONS GIVEN IN THE EMERGENCY:  Medications   ondansetron (ZOFRAN) injection 4 mg (4 mg Intravenous $Given 1/13/25 0055)   sodium chloride 0.9% BOLUS 1,000 mL (0 mLs Intravenous Stopped 1/13/25 0142)   dimenhyDRINATE (DRAMAMINE) injection 50 mg (50 mg Intravenous $Given 1/13/25 0115)   prochlorperazine (COMPAZINE) injection 10 mg (10 mg Intravenous $Given 1/13/25 0142)   sodium chloride 0.9% BOLUS 1,000 mL (0 mLs Intravenous Stopped 1/13/25 0234)   pantoprazole (PROTONIX) IV push injection 40 mg (40 mg Intravenous $Given 1/13/25 0206)       NEW PRESCRIPTIONS STARTED AT TODAY'S ER VISIT  Discharge Medication List as of 1/13/2025  2:34 AM        START taking these medications    Details   ondansetron (ZOFRAN ODT) 4 MG ODT tab Take 1-2 tablets (4-8 mg) by mouth every 8 hours as needed for nausea or vomiting., Disp-10 tablet, R-0, E-Prescribe      pantoprazole (PROTONIX) 40 MG EC tablet Take 1 tablet (40 mg) by mouth daily for 30 doses., Disp-30 tablet, R-0, E-Prescribe      prochlorperazine (COMPAZINE) 10 MG tablet Take 1 tablet (10 mg) by mouth every 6 hours as needed for nausea or vomiting., Disp-20 tablet, R-0,  E-Prescribe                =================================================================    HPI        Paulette Harvey is a 24 year old female with a pertinent history of ESTELA, major depression, nexplanon in place who presents to this ED by car for evaluation of hematemesis.    Patient came to the ED after noticing blood in her vomit. Her first episode of emesis had some dark red coloring. She took some Zofran and vomited again, this time it was all dark red. She reports feeling sick, weak, and dizzy but thinks this is from drinking alcohol tonight: 4 mimosas and 2 vodka redbulls. Patient denies abdominal pain, diarrhea, or a history of abdominal bleeding or ulcers.     Patient also notes of a reoccurring left sided headache that is present behind her left eye. These having been occurring almost ever day for the past few months, each episode lasts a few hours. Nothing makes it better or worse. She denies vision changes. Patient has no medical problems and is allergic to Nitrofurantoin and Clavulanic acid.      REVIEW OF SYSTEMS     Constitutional:  Denies fever or chills  HENT:  Denies sore throat   Respiratory:  Denies cough or shortness of breath   Cardiovascular:  Denies chest pain or palpitations  GI:  Denies abdominal pain, nausea, or vomiting  Musculoskeletal:  Denies any new extremity pain   Skin:  Denies rash   Neurologic:  Denies headache, focal weakness or sensory changes    All other systems reviewed and are negative      PAST MEDICAL HISTORY:  Past Medical History:   Diagnosis Date    Strep throat        PAST SURGICAL HISTORY:  Past Surgical History:   Procedure Laterality Date    NO HISTORY OF SURGERY             CURRENT MEDICATIONS:    ondansetron (ZOFRAN ODT) 4 MG ODT tab  pantoprazole (PROTONIX) 40 MG EC tablet  prochlorperazine (COMPAZINE) 10 MG tablet  ondansetron (ZOFRAN) 4 MG tablet        ALLERGIES:  Allergies   Allergen Reactions    Macrobid [Nitrofurantoin] Nausea and Vomiting      Antibiotic gastritis with recurrent vomiting.    Clavulanic Acid      See allergy note 12/23       FAMILY HISTORY:  Family History   Problem Relation Age of Onset    No Known Problems Mother     No Known Problems Father     Diabetes Maternal Grandmother     Tremor Maternal Grandfather     No Known Problems Paternal Grandmother     No Known Problems Paternal Grandfather        SOCIAL HISTORY:   Social History     Socioeconomic History    Marital status: Single   Tobacco Use    Smoking status: Never     Passive exposure: Current    Smokeless tobacco: Never    Tobacco comments:     vapes nicotine   Vaping Use    Vaping status: Every Day    Substances: Nicotine    Devices: Disposable, Pre-filled pod    Passive vaping exposure: Yes   Substance and Sexual Activity    Alcohol use: Yes    Drug use: Yes     Types: Marijuana    Sexual activity: Not Currently     Birth control/protection: I.U.D.     Social Drivers of Health     Financial Resource Strain: Low Risk  (8/8/2024)    Financial Resource Strain     Within the past 12 months, have you or your family members you live with been unable to get utilities (heat, electricity) when it was really needed?: No   Food Insecurity: Low Risk  (8/8/2024)    Food Insecurity     Within the past 12 months, did you worry that your food would run out before you got money to buy more?: No     Within the past 12 months, did the food you bought just not last and you didn t have money to get more?: No   Transportation Needs: Low Risk  (8/8/2024)    Transportation Needs     Within the past 12 months, has lack of transportation kept you from medical appointments, getting your medicines, non-medical meetings or appointments, work, or from getting things that you need?: No   Physical Activity: Insufficiently Active (8/8/2024)    Exercise Vital Sign     Days of Exercise per Week: 3 days     Minutes of Exercise per Session: 20 min   Stress: Stress Concern Present (8/8/2024)    [facility] of  "Occupational Health - Occupational Stress Questionnaire     Feeling of Stress : To some extent   Social Connections: Unknown (8/8/2024)    Social Connection and Isolation Panel [NHANES]     Frequency of Social Gatherings with Friends and Family: Twice a week   Interpersonal Safety: Low Risk  (8/8/2024)    Interpersonal Safety     Do you feel physically and emotionally safe where you currently live?: Yes     Within the past 12 months, have you been hit, slapped, kicked or otherwise physically hurt by someone?: No     Within the past 12 months, have you been humiliated or emotionally abused in other ways by your partner or ex-partner?: No   Housing Stability: High Risk (8/8/2024)    Housing Stability     Do you have housing? : No     Are you worried about losing your housing?: No       VITALS:  Patient Vitals for the past 24 hrs:   BP Temp Temp src Pulse Resp SpO2 Height Weight   01/13/25 0216 101/75 -- -- -- -- -- -- --   01/13/25 0200 115/71 -- -- -- -- -- -- --   01/13/25 0145 123/77 -- -- 114 -- -- -- --   01/13/25 0130 (!) 127/99 -- -- 81 -- 99 % -- --   01/13/25 0100 118/87 -- -- 78 -- 100 % -- --   01/13/25 0057 128/88 -- -- 82 -- 100 % -- --   01/13/25 0015 (!) 130/90 98.4  F (36.9  C) Temporal 90 20 97 % 1.651 m (5' 5\") 81.6 kg (180 lb)       PHYSICAL EXAM    Constitutional:  Well developed, Well nourished,  HENT:  Normocephalic, Atraumatic, Bilateral external ears normal, Oropharynx moist, Nose normal.   Neck:  Normal range of motion, No meningismus, No stridor.   Eyes:  EOMI, Conjunctiva normal, No discharge.   Respiratory:  Normal breath sounds, No respiratory distress, No wheezing, No chest tenderness.   Cardiovascular:  Normal heart rate, Normal rhythm, No murmurs  GI:  Soft, No tenderness, No guarding, No CVA tenderness.   Musculoskeletal:  Neurovascularly intact distally, No edema, No tenderness, No cyanosis, Good range of motion in all major joints. No tenderness to palpation or major deformities " noted.   Integument:  Warm, Dry, No erythema, No rash.   Lymphatic:  No lymphadenopathy noted.   Neurologic:  Alert & oriented x 3, Normal motor function, Normal sensory function, No focal deficits noted.   Psychiatric:  Affect normal, Judgment normal, Mood normal.      LAB:  All pertinent labs reviewed and interpreted.  Results for orders placed or performed during the hospital encounter of 01/13/25   Head CT w/o contrast    Impression    IMPRESSION:  1.  No acute intracranial process.   Result Value Ref Range    INR 0.99 0.85 - 1.15   Comprehensive metabolic panel   Result Value Ref Range    Sodium 140 135 - 145 mmol/L    Potassium 3.7 3.4 - 5.3 mmol/L    Carbon Dioxide (CO2) 20 (L) 22 - 29 mmol/L    Anion Gap 16 (H) 7 - 15 mmol/L    Urea Nitrogen 10.1 6.0 - 20.0 mg/dL    Creatinine 0.67 0.51 - 0.95 mg/dL    GFR Estimate >90 >60 mL/min/1.73m2    Calcium 9.1 8.8 - 10.4 mg/dL    Chloride 104 98 - 107 mmol/L    Glucose 104 (H) 70 - 99 mg/dL    Alkaline Phosphatase 46 40 - 150 U/L    AST 29 0 - 45 U/L    ALT 36 0 - 50 U/L    Protein Total 7.1 6.4 - 8.3 g/dL    Albumin 4.6 3.5 - 5.2 g/dL    Bilirubin Total 0.4 <=1.2 mg/dL   CBC with platelets and differential   Result Value Ref Range    WBC Count 9.5 4.0 - 11.0 10e3/uL    RBC Count 4.29 3.80 - 5.20 10e6/uL    Hemoglobin 14.0 11.7 - 15.7 g/dL    Hematocrit 40.2 35.0 - 47.0 %    MCV 94 78 - 100 fL    MCH 32.6 26.5 - 33.0 pg    MCHC 34.8 31.5 - 36.5 g/dL    RDW 12.6 10.0 - 15.0 %    Platelet Count 326 150 - 450 10e3/uL    % Neutrophils 58 %    % Lymphocytes 33 %    % Monocytes 7 %    % Eosinophils 1 %    % Basophils 1 %    % Immature Granulocytes 0 %    NRBCs per 100 WBC 0 <1 /100    Absolute Neutrophils 5.5 1.6 - 8.3 10e3/uL    Absolute Lymphocytes 3.2 0.8 - 5.3 10e3/uL    Absolute Monocytes 0.7 0.0 - 1.3 10e3/uL    Absolute Eosinophils 0.1 0.0 - 0.7 10e3/uL    Absolute Basophils 0.1 0.0 - 0.2 10e3/uL    Absolute Immature Granulocytes 0.0 <=0.4 10e3/uL    Absolute NRBCs  0.0 10e3/uL   Occult blood gastric   Result Value Ref Range    Occult Blood Gastric Positive (A) Negative       RADIOLOGY:  I have independently reviewed and interpreted the above imaging, pending the final radiology read.  Head CT w/o contrast   Final Result   IMPRESSION:   1.  No acute intracranial process.                  I, Amelie Delatorre, am serving as a scribe to document services personally performed by Dr. Angulo based on my observation and the provider's statements to me. I, Lakeshia Angulo MD attest that Amelie Delatorre is acting in a scribe capacity, has observed my performance of the services and has documented them in accordance with my direction.    Lakeshia Angulo MD  Emergency Medicine  [facility] EMERGENCY ROOM  [address] [id number] [phone]  Dept: [phone]       Lakeshia Angulo MD  01/13/25 2007

## 2025-01-13 NOTE — DISCHARGE INSTRUCTIONS
Abstain from alcohol  Clear liquid diet if having nausea and vomiting  Tylenol 650 mg every 4 hours as needed for pain  Dramamine as needed for nausea or vomiting  Return to the emergency department for worsening problems or concerns

## 2025-01-15 ENCOUNTER — PATIENT OUTREACH (OUTPATIENT)
Dept: FAMILY MEDICINE | Facility: CLINIC | Age: 25
End: 2025-01-15
Payer: COMMERCIAL

## 2025-01-15 NOTE — TELEPHONE ENCOUNTER
Transitions of Care Outreach  Chief Complaint   Patient presents with    Hospital F/U       Most Recent Admission Date: 1/13/2025   Most Recent Admission Diagnosis:      Most Recent Discharge Date: 1/13/2025   Most Recent Discharge Diagnosis: Nausea and vomiting, unspecified vomiting type - R11.2  Headache, unspecified headache type - R51.9     Transitions of Care Assessment    Discharge Assessment  How are you doing now that you are home?: feeling much better. no nausea. headache is gone. taking the antacid that she was prescribed.  How are your symptoms? (Red Flag symptoms escalate to triage hotline per guidelines): Improved  Do you know how to contact your clinic care team if you have future questions or changes to your health status? : Yes  Does the patient have their discharge instructions? : Yes  Does the patient have questions regarding their discharge instructions? : No  Were you started on any new medications or were there changes to any of your previous medications? : Yes  Does the patient have all of their medications?: Yes  Do you have questions regarding any of your medications? : No  Do you have all of your needed medical supplies or equipment (DME)?  (i.e. oxygen tank, CPAP, cane, etc.): Yes    Follow up Plan     Discharge Follow-Up  Discharge follow up appointment scheduled in alignment with recommended follow up timeframe or Transitions of Risk Category? (Low = within 30 days; Moderate= within 14 days; High= within 7 days): No (declines to schedule at time of call as is driving, will call back to clinic to schedule)  Patient's follow up appointment not scheduled: Patient declined scheduling support. Education on the importance of transitions of care follow up. Provided scheduling phone number.    No future appointments.    Outpatient Plan as outlined on AVS reviewed with patient.    For any urgent concerns, please contact our 24 hour nurse triage line: [phone] ([phone])       Mariposa  Dahlia ACNTU RN

## 2025-02-06 ENCOUNTER — OFFICE VISIT (OUTPATIENT)
Dept: FAMILY MEDICINE | Facility: CLINIC | Age: 25
End: 2025-02-06
Payer: COMMERCIAL

## 2025-02-06 VITALS
DIASTOLIC BLOOD PRESSURE: 62 MMHG | RESPIRATION RATE: 15 BRPM | WEIGHT: 188 LBS | TEMPERATURE: 97 F | HEIGHT: 65 IN | OXYGEN SATURATION: 97 % | BODY MASS INDEX: 31.32 KG/M2 | SYSTOLIC BLOOD PRESSURE: 109 MMHG | HEART RATE: 88 BPM

## 2025-02-06 DIAGNOSIS — G44.209 TENSION HEADACHE: ICD-10-CM

## 2025-02-06 DIAGNOSIS — Z09 HOSPITAL DISCHARGE FOLLOW-UP: Primary | ICD-10-CM

## 2025-02-06 DIAGNOSIS — R11.2 NAUSEA AND VOMITING, UNSPECIFIED VOMITING TYPE: ICD-10-CM

## 2025-02-06 ASSESSMENT — PAIN SCALES - GENERAL: PAINLEVEL_OUTOF10: NO PAIN (0)

## 2025-02-06 ASSESSMENT — PATIENT HEALTH QUESTIONNAIRE - PHQ9
SUM OF ALL RESPONSES TO PHQ QUESTIONS 1-9: 1
10. IF YOU CHECKED OFF ANY PROBLEMS, HOW DIFFICULT HAVE THESE PROBLEMS MADE IT FOR YOU TO DO YOUR WORK, TAKE CARE OF THINGS AT HOME, OR GET ALONG WITH OTHER PEOPLE: NOT DIFFICULT AT ALL
SUM OF ALL RESPONSES TO PHQ QUESTIONS 1-9: 1

## 2025-02-06 NOTE — PROGRESS NOTES
"  Assessment & Plan     Hospital discharge follow-up  Nausea and vomiting, unspecified vomiting type  Tension headache  Hospital discharge follow-up visit completed today.  I reviewed pertinent summaries, imaging and labs with the patient today.  Nausea and vomiting has subsided along with her headache.  We discussed that blood in vomit was likely due to Alexia-Vazquez tear.  She has had no ongoing symptoms.  She is advised to avoid alcohol use.  She should follow-up with any recurring or new symptoms/concerns.        MED REC REQUIRED  Post Medication Reconciliation Status:       See Patient Instructions    Raffaele Caldwell is a 24 year old, presenting for the following health issues:  ER F/U      2/6/2025     9:24 AM   Additional Questions   Roomed by Meg ARSHAD     Patient presents today for hospital discharge follow-up visit.  She was evaluated in [facility] emergency department on 1/13/2025 with symptoms of nausea and vomiting and headache.  The symptoms occurred the day after she reports \"day drinking\".  She states that she had several mimosas at [facility].  That night after going to bed she woke up with significant nausea and vomiting which appeared to have blood in it.  She vomited up blood a few more times which prompted the ER visit.  Patient's Gastroccult was positive and suspected that she had a Alexia-Vazquez tear gastritis.  Her head CT and vital signs were otherwise unremarkable.  She was discharged on Compazine, pantoprazole and Zofran.  Symptoms did resolve however she did develop neurovirus about a week after her ER visit.  She did not have any blood in her vomit at this time.  She has now been symptom-free and denies any continued symptoms or concerns.  She wonders about an intolerance or allergy to alcohol.  The patient states that she gets sick with both headaches and vomiting anytime she drinks alcohol even in small amounts.  Review of Systems - pertinent positives noted in HPI, otherwise ROS " "is negative.        Objective    /62 (BP Location: Left arm, Patient Position: Sitting, Cuff Size: Adult Large)   Pulse 88   Temp 97  F (36.1  C) (Temporal)   Resp 15   Ht 1.651 m (5' 5\")   Wt 85.3 kg (188 lb)   SpO2 97%   BMI 31.28 kg/m    Body mass index is 31.28 kg/m .  Physical Exam   GENERAL: alert and no distress  RESP: lungs clear to auscultation - no rales, rhonchi or wheezes  CV: regular rate and rhythm, normal S1 S2, no S3 or S4, no murmur, click or rub, no peripheral edema  ABDOMEN: soft, nontender, no hepatosplenomegaly, no masses and bowel sounds normal  MS: no gross musculoskeletal defects noted, no edema  NEURO: Normal strength and tone, mentation intact and speech normal  PSYCH: mentation appears normal, affect normal/bright            Signed Electronically by: BRYSON Rabago CNP    Answers submitted by the patient for this visit:  Patient Health Questionnaire (Submitted on 2/6/2025)  If you checked off any problems, how difficult have these problems made it for you to do your work, take care of things at home, or get along with other people?: Not difficult at all  PHQ9 TOTAL SCORE: 1    "

## 2025-08-13 ENCOUNTER — VIRTUAL VISIT (OUTPATIENT)
Dept: FAMILY MEDICINE | Facility: CLINIC | Age: 25
End: 2025-08-13
Payer: COMMERCIAL

## 2025-08-13 DIAGNOSIS — F33.1 MODERATE EPISODE OF RECURRENT MAJOR DEPRESSIVE DISORDER (H): ICD-10-CM

## 2025-08-13 DIAGNOSIS — F41.1 GENERALIZED ANXIETY DISORDER: Primary | ICD-10-CM

## 2025-08-13 PROCEDURE — 98006 SYNCH AUDIO-VIDEO EST MOD 30: CPT | Performed by: FAMILY MEDICINE

## 2025-08-13 RX ORDER — VENLAFAXINE HYDROCHLORIDE 37.5 MG/1
37.5 CAPSULE, EXTENDED RELEASE ORAL DAILY
Qty: 30 CAPSULE | Refills: 1 | Status: SHIPPED | OUTPATIENT
Start: 2025-08-13

## 2025-08-13 RX ORDER — VENLAFAXINE HYDROCHLORIDE 37.5 MG/1
37.5 CAPSULE, EXTENDED RELEASE ORAL DAILY
Qty: 30 CAPSULE | Refills: 1 | Status: SHIPPED | OUTPATIENT
Start: 2025-08-13 | End: 2025-08-13

## 2025-08-13 ASSESSMENT — ANXIETY QUESTIONNAIRES
8. IF YOU CHECKED OFF ANY PROBLEMS, HOW DIFFICULT HAVE THESE MADE IT FOR YOU TO DO YOUR WORK, TAKE CARE OF THINGS AT HOME, OR GET ALONG WITH OTHER PEOPLE?: VERY DIFFICULT
7. FEELING AFRAID AS IF SOMETHING AWFUL MIGHT HAPPEN: NEARLY EVERY DAY
GAD7 TOTAL SCORE: 21
3. WORRYING TOO MUCH ABOUT DIFFERENT THINGS: NEARLY EVERY DAY
1. FEELING NERVOUS, ANXIOUS, OR ON EDGE: NEARLY EVERY DAY
2. NOT BEING ABLE TO STOP OR CONTROL WORRYING: NEARLY EVERY DAY
6. BECOMING EASILY ANNOYED OR IRRITABLE: NEARLY EVERY DAY
GAD7 TOTAL SCORE: 21
5. BEING SO RESTLESS THAT IT IS HARD TO SIT STILL: NEARLY EVERY DAY
7. FEELING AFRAID AS IF SOMETHING AWFUL MIGHT HAPPEN: NEARLY EVERY DAY
IF YOU CHECKED OFF ANY PROBLEMS ON THIS QUESTIONNAIRE, HOW DIFFICULT HAVE THESE PROBLEMS MADE IT FOR YOU TO DO YOUR WORK, TAKE CARE OF THINGS AT HOME, OR GET ALONG WITH OTHER PEOPLE: VERY DIFFICULT
4. TROUBLE RELAXING: NEARLY EVERY DAY
GAD7 TOTAL SCORE: 21

## 2025-08-13 ASSESSMENT — PATIENT HEALTH QUESTIONNAIRE - PHQ9
SUM OF ALL RESPONSES TO PHQ QUESTIONS 1-9: 4
10. IF YOU CHECKED OFF ANY PROBLEMS, HOW DIFFICULT HAVE THESE PROBLEMS MADE IT FOR YOU TO DO YOUR WORK, TAKE CARE OF THINGS AT HOME, OR GET ALONG WITH OTHER PEOPLE: NOT DIFFICULT AT ALL
SUM OF ALL RESPONSES TO PHQ QUESTIONS 1-9: 4